# Patient Record
Sex: MALE | Race: WHITE | Employment: OTHER | URBAN - METROPOLITAN AREA
[De-identification: names, ages, dates, MRNs, and addresses within clinical notes are randomized per-mention and may not be internally consistent; named-entity substitution may affect disease eponyms.]

---

## 2024-09-12 ENCOUNTER — HOSPITAL ENCOUNTER (EMERGENCY)
Age: 79
Discharge: HOME | End: 2024-09-12
Attending: EMERGENCY MEDICINE
Payer: MEDICARE

## 2024-09-12 VITALS
HEART RATE: 81 BPM | WEIGHT: 171.96 LBS | BODY MASS INDEX: 26.06 KG/M2 | HEIGHT: 68 IN | OXYGEN SATURATION: 100 % | SYSTOLIC BLOOD PRESSURE: 150 MMHG | TEMPERATURE: 98.1 F | DIASTOLIC BLOOD PRESSURE: 69 MMHG | RESPIRATION RATE: 18 BRPM

## 2024-09-12 DIAGNOSIS — R51.9 TEMPORAL PAIN: Primary | ICD-10-CM

## 2024-09-12 PROCEDURE — 99282 EMERGENCY DEPT VISIT SF MDM: CPT

## 2024-09-12 ASSESSMENT — PAIN DESCRIPTION - ORIENTATION: ORIENTATION: RIGHT

## 2024-09-12 ASSESSMENT — PAIN DESCRIPTION - LOCATION: LOCATION: HEAD

## 2024-09-12 ASSESSMENT — PAIN SCALES - GENERAL: PAINLEVEL_OUTOF10: 5

## 2024-09-12 ASSESSMENT — PAIN - FUNCTIONAL ASSESSMENT: PAIN_FUNCTIONAL_ASSESSMENT: 0-10

## 2024-09-12 ASSESSMENT — PAIN DESCRIPTION - PAIN TYPE: TYPE: ACUTE PAIN

## 2025-01-18 ENCOUNTER — APPOINTMENT (OUTPATIENT)
Dept: CT IMAGING | Age: 80
DRG: 445 | End: 2025-01-18
Payer: MEDICARE

## 2025-01-18 ENCOUNTER — HOSPITAL ENCOUNTER (INPATIENT)
Age: 80
LOS: 4 days | Discharge: HOME | DRG: 445 | End: 2025-01-22
Attending: EMERGENCY MEDICINE | Admitting: STUDENT IN AN ORGANIZED HEALTH CARE EDUCATION/TRAINING PROGRAM
Payer: MEDICARE

## 2025-01-18 ENCOUNTER — APPOINTMENT (OUTPATIENT)
Dept: GENERAL RADIOLOGY | Age: 80
DRG: 445 | End: 2025-01-18
Payer: MEDICARE
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
## 2025-01-18 DIAGNOSIS — R74.8 ELEVATED LIVER ENZYMES: ICD-10-CM

## 2025-01-18 DIAGNOSIS — J90 BILATERAL PLEURAL EFFUSION: ICD-10-CM

## 2025-01-18 DIAGNOSIS — K81.9 CHOLECYSTITIS: Primary | ICD-10-CM

## 2025-01-18 PROBLEM — K80.20 SYMPTOMATIC CHOLELITHIASIS: Status: ACTIVE | Noted: 2025-01-18

## 2025-01-18 LAB
ALBUMIN SERPL-MCNC: 3.8 G/DL (ref 3.5–5.2)
ALBUMIN/GLOB SERPL: 1.2 {RATIO} (ref 1–2.5)
ALP SERPL-CCNC: 248 U/L (ref 40–129)
ALT SERPL-CCNC: 168 U/L (ref 5–41)
AMMONIA PLAS-SCNC: 20 UMOL/L (ref 16–60)
ANION GAP SERPL CALCULATED.3IONS-SCNC: 10 MMOL/L (ref 9–17)
AST SERPL-CCNC: 170 U/L
BASOPHILS # BLD: 0 K/UL (ref 0–0.2)
BASOPHILS NFR BLD: 0 % (ref 0–2)
BILIRUB SERPL-MCNC: 4 MG/DL (ref 0.3–1.2)
BILIRUB UR QL STRIP: NEGATIVE
BNP SERPL-MCNC: 525 PG/ML
BUN SERPL-MCNC: 16 MG/DL (ref 8–23)
CALCIUM SERPL-MCNC: 8.7 MG/DL (ref 8.6–10.4)
CHLORIDE SERPL-SCNC: 102 MMOL/L (ref 98–107)
CLARITY UR: CLEAR
CO2 SERPL-SCNC: 23 MMOL/L (ref 20–31)
COLOR UR: YELLOW
COMMENT: ABNORMAL
CREAT SERPL-MCNC: 0.7 MG/DL (ref 0.7–1.2)
D DIMER PPP FEU-MCNC: 0.97 UG/ML FEU (ref 0–0.59)
EOSINOPHIL # BLD: 0.1 K/UL (ref 0–0.4)
EOSINOPHILS RELATIVE PERCENT: 1 % (ref 1–4)
ERYTHROCYTE [DISTWIDTH] IN BLOOD BY AUTOMATED COUNT: 13.8 % (ref 12.5–15.4)
FLUAV AG SPEC QL: NEGATIVE
FLUBV AG SPEC QL: NEGATIVE
GFR, ESTIMATED: >90 ML/MIN/1.73M2
GLUCOSE SERPL-MCNC: 141 MG/DL (ref 70–99)
GLUCOSE UR STRIP-MCNC: ABNORMAL MG/DL
HCT VFR BLD AUTO: 44.2 % (ref 41–53)
HGB BLD-MCNC: 15.3 G/DL (ref 13.5–17.5)
HGB UR QL STRIP.AUTO: NEGATIVE
KETONES UR STRIP-MCNC: NEGATIVE MG/DL
LACTATE BLDV-SCNC: 1.3 MMOL/L (ref 0.5–2.2)
LEUKOCYTE ESTERASE UR QL STRIP: NEGATIVE
LIPASE SERPL-CCNC: 187 U/L (ref 13–60)
LYMPHOCYTES NFR BLD: 2.3 K/UL (ref 1–4.8)
LYMPHOCYTES RELATIVE PERCENT: 23 % (ref 24–44)
MAGNESIUM SERPL-MCNC: 2.1 MG/DL (ref 1.6–2.6)
MCH RBC QN AUTO: 31.6 PG (ref 26–34)
MCHC RBC AUTO-ENTMCNC: 34.7 G/DL (ref 31–37)
MCV RBC AUTO: 91.2 FL (ref 80–100)
MONOCYTES NFR BLD: 1.1 K/UL (ref 0.1–1.2)
MONOCYTES NFR BLD: 11 % (ref 2–11)
NEUTROPHILS NFR BLD: 65 % (ref 36–66)
NEUTS SEG NFR BLD: 6.6 K/UL (ref 1.8–7.7)
NITRITE UR QL STRIP: NEGATIVE
PH UR STRIP: 5.5 [PH] (ref 5–8)
PLATELET # BLD AUTO: 148 K/UL (ref 140–450)
PMV BLD AUTO: 9.1 FL (ref 6–12)
POTASSIUM SERPL-SCNC: 4 MMOL/L (ref 3.7–5.3)
PROT SERPL-MCNC: 6.9 G/DL (ref 6.4–8.3)
PROT UR STRIP-MCNC: NEGATIVE MG/DL
RBC # BLD AUTO: 4.84 M/UL (ref 4.5–5.9)
SARS-COV-2 RDRP RESP QL NAA+PROBE: NOT DETECTED
SODIUM SERPL-SCNC: 135 MMOL/L (ref 135–144)
SP GR UR STRIP: 1.01 (ref 1–1.03)
SPECIMEN DESCRIPTION: NORMAL
TROPONIN I SERPL HS-MCNC: 10 NG/L (ref 0–22)
TROPONIN I SERPL HS-MCNC: 9 NG/L (ref 0–22)
TSH SERPL DL<=0.05 MIU/L-ACNC: 2.23 UIU/ML (ref 0.3–5)
UROBILINOGEN UR STRIP-ACNC: NORMAL EU/DL (ref 0–1)
WBC OTHER # BLD: 10.2 K/UL (ref 3.5–11)

## 2025-01-18 PROCEDURE — 83690 ASSAY OF LIPASE: CPT

## 2025-01-18 PROCEDURE — 70450 CT HEAD/BRAIN W/O DYE: CPT

## 2025-01-18 PROCEDURE — 2580000003 HC RX 258

## 2025-01-18 PROCEDURE — 6360000002 HC RX W HCPCS: Performed by: PHYSICIAN ASSISTANT

## 2025-01-18 PROCEDURE — 2500000003 HC RX 250 WO HCPCS: Performed by: EMERGENCY MEDICINE

## 2025-01-18 PROCEDURE — 86644 CMV ANTIBODY: CPT

## 2025-01-18 PROCEDURE — 84443 ASSAY THYROID STIM HORMONE: CPT

## 2025-01-18 PROCEDURE — 71260 CT THORAX DX C+: CPT

## 2025-01-18 PROCEDURE — 1200000000 HC SEMI PRIVATE

## 2025-01-18 PROCEDURE — 84484 ASSAY OF TROPONIN QUANT: CPT

## 2025-01-18 PROCEDURE — 86664 EPSTEIN-BARR NUCLEAR ANTIGEN: CPT

## 2025-01-18 PROCEDURE — 86665 EPSTEIN-BARR CAPSID VCA: CPT

## 2025-01-18 PROCEDURE — 83605 ASSAY OF LACTIC ACID: CPT

## 2025-01-18 PROCEDURE — 6360000004 HC RX CONTRAST MEDICATION: Performed by: EMERGENCY MEDICINE

## 2025-01-18 PROCEDURE — 83735 ASSAY OF MAGNESIUM: CPT

## 2025-01-18 PROCEDURE — 82140 ASSAY OF AMMONIA: CPT

## 2025-01-18 PROCEDURE — 85379 FIBRIN DEGRADATION QUANT: CPT

## 2025-01-18 PROCEDURE — 83516 IMMUNOASSAY NONANTIBODY: CPT

## 2025-01-18 PROCEDURE — 80053 COMPREHEN METABOLIC PANEL: CPT

## 2025-01-18 PROCEDURE — 71045 X-RAY EXAM CHEST 1 VIEW: CPT

## 2025-01-18 PROCEDURE — 86663 EPSTEIN-BARR ANTIBODY: CPT

## 2025-01-18 PROCEDURE — 36415 COLL VENOUS BLD VENIPUNCTURE: CPT

## 2025-01-18 PROCEDURE — 86225 DNA ANTIBODY NATIVE: CPT

## 2025-01-18 PROCEDURE — 93005 ELECTROCARDIOGRAM TRACING: CPT

## 2025-01-18 PROCEDURE — 81003 URINALYSIS AUTO W/O SCOPE: CPT

## 2025-01-18 PROCEDURE — 85025 COMPLETE CBC W/AUTO DIFF WBC: CPT

## 2025-01-18 PROCEDURE — 74177 CT ABD & PELVIS W/CONTRAST: CPT

## 2025-01-18 PROCEDURE — 86038 ANTINUCLEAR ANTIBODIES: CPT

## 2025-01-18 PROCEDURE — 87086 URINE CULTURE/COLONY COUNT: CPT

## 2025-01-18 PROCEDURE — 87804 INFLUENZA ASSAY W/OPTIC: CPT

## 2025-01-18 PROCEDURE — 86645 CMV ANTIBODY IGM: CPT

## 2025-01-18 PROCEDURE — 99285 EMERGENCY DEPT VISIT HI MDM: CPT

## 2025-01-18 PROCEDURE — 83880 ASSAY OF NATRIURETIC PEPTIDE: CPT

## 2025-01-18 PROCEDURE — 80074 ACUTE HEPATITIS PANEL: CPT

## 2025-01-18 PROCEDURE — 87635 SARS-COV-2 COVID-19 AMP PRB: CPT

## 2025-01-18 RX ORDER — ACETAMINOPHEN 325 MG/1
650 TABLET ORAL EVERY 6 HOURS PRN
Status: DISCONTINUED | OUTPATIENT
Start: 2025-01-18 | End: 2025-01-22 | Stop reason: HOSPADM

## 2025-01-18 RX ORDER — CIPROFLOXACIN 2 MG/ML
400 INJECTION, SOLUTION INTRAVENOUS ONCE
Status: COMPLETED | OUTPATIENT
Start: 2025-01-18 | End: 2025-01-18

## 2025-01-18 RX ORDER — FAMOTIDINE 40 MG/1
40 TABLET, FILM COATED ORAL 2 TIMES DAILY
COMMUNITY

## 2025-01-18 RX ORDER — BISOPROLOL FUMARATE 5 MG/1
5 TABLET, FILM COATED ORAL DAILY
COMMUNITY

## 2025-01-18 RX ORDER — ACETAMINOPHEN 650 MG/1
650 SUPPOSITORY RECTAL EVERY 6 HOURS PRN
Status: DISCONTINUED | OUTPATIENT
Start: 2025-01-18 | End: 2025-01-22 | Stop reason: HOSPADM

## 2025-01-18 RX ORDER — ALFUZOSIN HYDROCHLORIDE 10 MG/1
10 TABLET, EXTENDED RELEASE ORAL 2 TIMES DAILY
COMMUNITY

## 2025-01-18 RX ORDER — SODIUM CHLORIDE 9 MG/ML
INJECTION, SOLUTION INTRAVENOUS CONTINUOUS
Status: DISCONTINUED | OUTPATIENT
Start: 2025-01-18 | End: 2025-01-19

## 2025-01-18 RX ORDER — LOSARTAN POTASSIUM 50 MG/1
50 TABLET ORAL DAILY
COMMUNITY

## 2025-01-18 RX ORDER — ONDANSETRON 4 MG/1
4 TABLET, ORALLY DISINTEGRATING ORAL EVERY 8 HOURS PRN
Status: DISCONTINUED | OUTPATIENT
Start: 2025-01-18 | End: 2025-01-22 | Stop reason: HOSPADM

## 2025-01-18 RX ORDER — SILODOSIN 8 MG/1
8 CAPSULE ORAL EVERY EVENING
COMMUNITY

## 2025-01-18 RX ORDER — ASPIRIN 81 MG/1
81 TABLET, CHEWABLE ORAL DAILY
COMMUNITY

## 2025-01-18 RX ORDER — SODIUM CHLORIDE 0.9 % (FLUSH) 0.9 %
5-40 SYRINGE (ML) INJECTION EVERY 12 HOURS SCHEDULED
Status: DISCONTINUED | OUTPATIENT
Start: 2025-01-18 | End: 2025-01-22 | Stop reason: HOSPADM

## 2025-01-18 RX ORDER — SODIUM CHLORIDE 9 MG/ML
INJECTION, SOLUTION INTRAVENOUS PRN
Status: DISCONTINUED | OUTPATIENT
Start: 2025-01-18 | End: 2025-01-22 | Stop reason: HOSPADM

## 2025-01-18 RX ORDER — SODIUM CHLORIDE 0.9 % (FLUSH) 0.9 %
10 SYRINGE (ML) INJECTION PRN
Status: DISCONTINUED | OUTPATIENT
Start: 2025-01-18 | End: 2025-01-22 | Stop reason: HOSPADM

## 2025-01-18 RX ORDER — ONDANSETRON 2 MG/ML
4 INJECTION INTRAMUSCULAR; INTRAVENOUS EVERY 6 HOURS PRN
Status: DISCONTINUED | OUTPATIENT
Start: 2025-01-18 | End: 2025-01-22 | Stop reason: HOSPADM

## 2025-01-18 RX ORDER — POLYETHYLENE GLYCOL 3350 17 G/17G
17 POWDER, FOR SOLUTION ORAL DAILY PRN
Status: DISCONTINUED | OUTPATIENT
Start: 2025-01-18 | End: 2025-01-22 | Stop reason: HOSPADM

## 2025-01-18 RX ORDER — INSULIN GLARGINE 100 [IU]/ML
INJECTION, SOLUTION SUBCUTANEOUS
Status: ON HOLD | COMMUNITY
End: 2025-01-22 | Stop reason: HOSPADM

## 2025-01-18 RX ORDER — DUTASTERIDE 0.5 MG/1
0.5 CAPSULE, LIQUID FILLED ORAL DAILY
COMMUNITY

## 2025-01-18 RX ORDER — ENOXAPARIN SODIUM 100 MG/ML
40 INJECTION SUBCUTANEOUS DAILY
Status: DISCONTINUED | OUTPATIENT
Start: 2025-01-19 | End: 2025-01-22 | Stop reason: HOSPADM

## 2025-01-18 RX ORDER — 0.9 % SODIUM CHLORIDE 0.9 %
80 INTRAVENOUS SOLUTION INTRAVENOUS ONCE
Status: DISCONTINUED | OUTPATIENT
Start: 2025-01-18 | End: 2025-01-22 | Stop reason: HOSPADM

## 2025-01-18 RX ORDER — METRONIDAZOLE 500 MG/100ML
500 INJECTION, SOLUTION INTRAVENOUS EVERY 8 HOURS
Status: DISCONTINUED | OUTPATIENT
Start: 2025-01-19 | End: 2025-01-20

## 2025-01-18 RX ORDER — MORPHINE SULFATE 2 MG/ML
1 INJECTION, SOLUTION INTRAMUSCULAR; INTRAVENOUS EVERY 4 HOURS PRN
Status: DISCONTINUED | OUTPATIENT
Start: 2025-01-18 | End: 2025-01-22 | Stop reason: HOSPADM

## 2025-01-18 RX ORDER — POTASSIUM CHLORIDE 7.45 MG/ML
10 INJECTION INTRAVENOUS PRN
Status: DISCONTINUED | OUTPATIENT
Start: 2025-01-18 | End: 2025-01-22 | Stop reason: HOSPADM

## 2025-01-18 RX ORDER — METRONIDAZOLE 500 MG/100ML
500 INJECTION, SOLUTION INTRAVENOUS ONCE
Status: COMPLETED | OUTPATIENT
Start: 2025-01-18 | End: 2025-01-18

## 2025-01-18 RX ORDER — POTASSIUM CHLORIDE 1500 MG/1
40 TABLET, EXTENDED RELEASE ORAL PRN
Status: DISCONTINUED | OUTPATIENT
Start: 2025-01-18 | End: 2025-01-22 | Stop reason: HOSPADM

## 2025-01-18 RX ORDER — SITAGLIPTIN AND METFORMIN HYDROCHLORIDE 500; 50 MG/1; MG/1
1 TABLET, FILM COATED ORAL 2 TIMES DAILY WITH MEALS
COMMUNITY

## 2025-01-18 RX ORDER — SERTRALINE HYDROCHLORIDE 25 MG/1
25 TABLET, FILM COATED ORAL DAILY
COMMUNITY

## 2025-01-18 RX ORDER — IOPAMIDOL 755 MG/ML
75 INJECTION, SOLUTION INTRAVASCULAR
Status: COMPLETED | OUTPATIENT
Start: 2025-01-18 | End: 2025-01-18

## 2025-01-18 RX ORDER — MAGNESIUM SULFATE IN WATER 40 MG/ML
2000 INJECTION, SOLUTION INTRAVENOUS PRN
Status: DISCONTINUED | OUTPATIENT
Start: 2025-01-18 | End: 2025-01-22 | Stop reason: HOSPADM

## 2025-01-18 RX ORDER — MEMANTINE HYDROCHLORIDE 5 MG/1
5 TABLET ORAL DAILY
COMMUNITY

## 2025-01-18 RX ADMIN — SODIUM CHLORIDE: 9 INJECTION, SOLUTION INTRAVENOUS at 22:54

## 2025-01-18 RX ADMIN — IOPAMIDOL 75 ML: 755 INJECTION, SOLUTION INTRAVENOUS at 17:00

## 2025-01-18 RX ADMIN — CIPROFLOXACIN 400 MG: 2 INJECTION, SOLUTION INTRAVENOUS at 19:46

## 2025-01-18 RX ADMIN — Medication 80 ML: at 17:00

## 2025-01-18 RX ADMIN — METRONIDAZOLE 500 MG: 500 INJECTION, SOLUTION INTRAVENOUS at 21:07

## 2025-01-18 RX ADMIN — SODIUM CHLORIDE, PRESERVATIVE FREE 10 ML: 5 INJECTION INTRAVENOUS at 17:00

## 2025-01-18 ASSESSMENT — PAIN - FUNCTIONAL ASSESSMENT: PAIN_FUNCTIONAL_ASSESSMENT: NONE - DENIES PAIN

## 2025-01-18 NOTE — ED PROVIDER NOTES
Emergency Department     Faculty Attestation    I performed a history and physical examination of the patient and discussed management with the mid level provider. I reviewed the mid level provider's note and agree with the documented findings and plan of care. Any areas of disagreement are noted on the chart. I was personally present for the key portions of any procedures. I have documented in the chart those procedures where I was not present during the key portions. I have reviewed the emergency nurses triage note. I agree with the chief complaint, past medical history, past surgical history, allergies, medications, social and family history as documented unless otherwise noted below. Documentation of the HPI, Physical Exam and Medical Decision Making performed by medical students or scribes is based on my personal performance of the HPI, PE and MDM. For Physician Assistant/ Nurse Practitioner cases/documentation I have personally evaluated this patient and have completed at least one if not all key elements of the E/M (history, physical exam, and MDM). Additional findings are as noted.      Primary Care Physician:  No primary care provider on file.    CHIEF COMPLAINT       Chief Complaint   Patient presents with    Fatigue    Fall     Pt arrives with co falling and leg weakness.Pt states he has been here for approx 5 mths from [place] and is due to go back on the 1/29/25. Per pt his family is concerned with his falling and would like to make sure he doesn't have clots in his legs before traveling.        RECENT VITALS:   Temp: 97.9 °F (36.6 °C),  Pulse: 76, Respirations: 16, BP: 128/68    LABS:  Labs Reviewed   CBC WITH AUTO DIFFERENTIAL - Abnormal; Notable for the following components:       Result Value    Lymphocytes % 23 (*)     All other components within normal limits   COMPREHENSIVE METABOLIC PANEL - Abnormal; Notable for the following components:    Glucose 141 (*)     Total Bilirubin 4.0 (*)

## 2025-01-18 NOTE — ED PROVIDER NOTES
EMERGENCY DEPARTMENT ENCOUNTER    Pt Name: Robert Connolly  MRN: [id number]  Birthdate 1945  Date of evaluation: 1/18/25  CHIEF COMPLAINT       Chief Complaint   Patient presents with    Fatigue    Fall     Pt arrives with co falling and leg weakness.Pt states he has been here for approx 5 mths from [place] and is due to go back on the 1/29/25. Per pt his family is concerned with his falling and would like to make sure he doesn't have clots in his legs before traveling.      HISTORY OF PRESENT ILLNESS   Patient is a 79-year-old male who presents with his son-in-law for evaluation of falls.  The patient states that these falls originally started back in August to 2024.  He usually lives in [place] but in August had started making a trip to see his brothers and sisters and his children along the way.  At that time he was in [place] visiting his brother who happens to be a heart surgeon.  He had a syncopal episode while he was standing up urinating.  There was a workup obtained and he was told that his syncope was secondary to multiple concussions that he had when he was younger.  The patient states that since then especially over the past week he has had multiple falls where he trips and falls, does not feel like he is passing out.  3 days ago he started to have right sided chest pressure which she has been treating at home with Tylenol.  He does have difficulty getting up off of the commode.  He has not had a cough fever or chills.  He             REVIEW OF SYSTEMS     Review of Systems   Constitutional:  Negative for chills and fever.   HENT:  Negative for ear pain, rhinorrhea and sore throat.    Eyes:  Negative for pain, discharge and itching.   Respiratory:  Negative for cough, shortness of breath and wheezing.    Cardiovascular:  Positive for chest pain and palpitations.   Gastrointestinal:  Positive for nausea. Negative for vomiting.   Genitourinary:  Negative for difficulty urinating, dysuria and

## 2025-01-19 PROBLEM — K81.9 CHOLECYSTITIS: Status: ACTIVE | Noted: 2025-01-19

## 2025-01-19 PROBLEM — R79.89 ELEVATED LFTS: Status: ACTIVE | Noted: 2025-01-19

## 2025-01-19 LAB
ALBUMIN SERPL-MCNC: 3.5 G/DL (ref 3.5–5.2)
ALBUMIN/GLOB SERPL: 1.3 {RATIO} (ref 1–2.5)
ALP SERPL-CCNC: 220 U/L (ref 40–129)
ALT SERPL-CCNC: 111 U/L (ref 5–41)
ANION GAP SERPL CALCULATED.3IONS-SCNC: 9 MMOL/L (ref 9–17)
AST SERPL-CCNC: 77 U/L
BASOPHILS # BLD: 0.02 K/UL (ref 0–0.2)
BASOPHILS NFR BLD: 0 % (ref 0–2)
BILIRUB SERPL-MCNC: 3.3 MG/DL (ref 0.3–1.2)
BUN SERPL-MCNC: 12 MG/DL (ref 8–23)
CALCIUM SERPL-MCNC: 8.3 MG/DL (ref 8.6–10.4)
CHLORIDE SERPL-SCNC: 107 MMOL/L (ref 98–107)
CHOLEST SERPL-MCNC: 79 MG/DL (ref 0–199)
CHOLESTEROL/HDL RATIO: 2.3
CO2 SERPL-SCNC: 24 MMOL/L (ref 20–31)
CREAT SERPL-MCNC: 0.7 MG/DL (ref 0.7–1.2)
EOSINOPHIL # BLD: 0.17 K/UL (ref 0–0.4)
EOSINOPHILS RELATIVE PERCENT: 2 % (ref 1–4)
ERYTHROCYTE [DISTWIDTH] IN BLOOD BY AUTOMATED COUNT: 13.3 % (ref 12.5–15.4)
GFR, ESTIMATED: >90 ML/MIN/1.73M2
GLUCOSE BLD-MCNC: 106 MG/DL (ref 75–110)
GLUCOSE BLD-MCNC: 143 MG/DL (ref 75–110)
GLUCOSE BLD-MCNC: 69 MG/DL (ref 75–110)
GLUCOSE BLD-MCNC: 69 MG/DL (ref 75–110)
GLUCOSE BLD-MCNC: 87 MG/DL (ref 75–110)
GLUCOSE BLD-MCNC: 88 MG/DL (ref 75–110)
GLUCOSE SERPL-MCNC: 87 MG/DL (ref 70–99)
HAV IGM SERPL QL IA: NONREACTIVE
HBV CORE IGM SERPL QL IA: NONREACTIVE
HBV SURFACE AG SERPL QL IA: NONREACTIVE
HCT VFR BLD AUTO: 41 % (ref 41–53)
HCV AB SERPL QL IA: NONREACTIVE
HDLC SERPL-MCNC: 35 MG/DL
HGB BLD-MCNC: 14.3 G/DL (ref 13.5–17.5)
LDLC SERPL CALC-MCNC: 28 MG/DL (ref 0–100)
LIPASE SERPL-CCNC: 196 U/L (ref 13–60)
LYMPHOCYTES NFR BLD: 2.17 K/UL (ref 1–4.8)
LYMPHOCYTES RELATIVE PERCENT: 28 % (ref 24–44)
MAGNESIUM SERPL-MCNC: 2.2 MG/DL (ref 1.6–2.6)
MCH RBC QN AUTO: 31 PG (ref 26–34)
MCHC RBC AUTO-ENTMCNC: 34.9 G/DL (ref 31–37)
MCV RBC AUTO: 88.7 FL (ref 80–100)
MONOCYTES NFR BLD: 0.87 K/UL (ref 0.1–1.2)
MONOCYTES NFR BLD: 11 % (ref 2–11)
NEUTROPHILS NFR BLD: 59 % (ref 36–66)
NEUTS SEG NFR BLD: 4.64 K/UL (ref 1.8–7.7)
PHOSPHATE SERPL-MCNC: 2.8 MG/DL (ref 2.5–4.5)
PLATELET # BLD AUTO: 136 K/UL (ref 140–450)
PMV BLD AUTO: 10.8 FL (ref 8–14)
POTASSIUM SERPL-SCNC: 3.5 MMOL/L (ref 3.7–5.3)
PROT SERPL-MCNC: 6.2 G/DL (ref 6.4–8.3)
RBC # BLD AUTO: 4.62 M/UL (ref 4.5–5.9)
SODIUM SERPL-SCNC: 140 MMOL/L (ref 135–144)
TRIGL SERPL-MCNC: 81 MG/DL
VLDLC SERPL CALC-MCNC: 16 MG/DL (ref 1–30)
WBC OTHER # BLD: 7.9 K/UL (ref 3.5–11)

## 2025-01-19 PROCEDURE — APPNB60 APP NON BILLABLE TIME 46-60 MINS: Performed by: NURSE PRACTITIONER

## 2025-01-19 PROCEDURE — 83516 IMMUNOASSAY NONANTIBODY: CPT

## 2025-01-19 PROCEDURE — 86376 MICROSOMAL ANTIBODY EACH: CPT

## 2025-01-19 PROCEDURE — 6370000000 HC RX 637 (ALT 250 FOR IP): Performed by: INTERNAL MEDICINE

## 2025-01-19 PROCEDURE — 2580000003 HC RX 258: Performed by: NURSE PRACTITIONER

## 2025-01-19 PROCEDURE — 82390 ASSAY OF CERULOPLASMIN: CPT

## 2025-01-19 PROCEDURE — 36415 COLL VENOUS BLD VENIPUNCTURE: CPT

## 2025-01-19 PROCEDURE — 6370000000 HC RX 637 (ALT 250 FOR IP)

## 2025-01-19 PROCEDURE — 1200000000 HC SEMI PRIVATE

## 2025-01-19 PROCEDURE — 6360000002 HC RX W HCPCS

## 2025-01-19 PROCEDURE — 99223 1ST HOSP IP/OBS HIGH 75: CPT | Performed by: INTERNAL MEDICINE

## 2025-01-19 PROCEDURE — 84100 ASSAY OF PHOSPHORUS: CPT

## 2025-01-19 PROCEDURE — 83690 ASSAY OF LIPASE: CPT

## 2025-01-19 PROCEDURE — 80053 COMPREHEN METABOLIC PANEL: CPT

## 2025-01-19 PROCEDURE — 82103 ALPHA-1-ANTITRYPSIN TOTAL: CPT

## 2025-01-19 PROCEDURE — 85025 COMPLETE CBC W/AUTO DIFF WBC: CPT

## 2025-01-19 PROCEDURE — 80061 LIPID PANEL: CPT

## 2025-01-19 PROCEDURE — 82947 ASSAY GLUCOSE BLOOD QUANT: CPT

## 2025-01-19 PROCEDURE — 83735 ASSAY OF MAGNESIUM: CPT

## 2025-01-19 PROCEDURE — 2580000003 HC RX 258

## 2025-01-19 PROCEDURE — 99222 1ST HOSP IP/OBS MODERATE 55: CPT | Performed by: INTERNAL MEDICINE

## 2025-01-19 RX ORDER — SERTRALINE HYDROCHLORIDE 25 MG/1
25 TABLET, FILM COATED ORAL DAILY
Status: DISCONTINUED | OUTPATIENT
Start: 2025-01-19 | End: 2025-01-22 | Stop reason: HOSPADM

## 2025-01-19 RX ORDER — FINASTERIDE 5 MG/1
5 TABLET, FILM COATED ORAL DAILY
Status: DISCONTINUED | OUTPATIENT
Start: 2025-01-19 | End: 2025-01-22 | Stop reason: HOSPADM

## 2025-01-19 RX ORDER — DEXTROSE MONOHYDRATE 100 MG/ML
INJECTION, SOLUTION INTRAVENOUS CONTINUOUS PRN
Status: DISCONTINUED | OUTPATIENT
Start: 2025-01-19 | End: 2025-01-22 | Stop reason: HOSPADM

## 2025-01-19 RX ORDER — GLUCAGON 1 MG/ML
1 KIT INJECTION PRN
Status: DISCONTINUED | OUTPATIENT
Start: 2025-01-19 | End: 2025-01-22 | Stop reason: HOSPADM

## 2025-01-19 RX ORDER — DEXTROSE MONOHYDRATE AND SODIUM CHLORIDE 5; .45 G/100ML; G/100ML
INJECTION, SOLUTION INTRAVENOUS CONTINUOUS
Status: DISCONTINUED | OUTPATIENT
Start: 2025-01-19 | End: 2025-01-20

## 2025-01-19 RX ORDER — ALOGLIPTIN 12.5 MG/1
12.5 TABLET, FILM COATED ORAL DAILY
Status: DISCONTINUED | OUTPATIENT
Start: 2025-01-19 | End: 2025-01-22 | Stop reason: HOSPADM

## 2025-01-19 RX ORDER — METOPROLOL SUCCINATE 50 MG/1
50 TABLET, EXTENDED RELEASE ORAL DAILY
Status: DISCONTINUED | OUTPATIENT
Start: 2025-01-19 | End: 2025-01-22 | Stop reason: HOSPADM

## 2025-01-19 RX ORDER — TAMSULOSIN HYDROCHLORIDE 0.4 MG/1
0.4 CAPSULE ORAL DAILY
Status: DISCONTINUED | OUTPATIENT
Start: 2025-01-19 | End: 2025-01-22 | Stop reason: HOSPADM

## 2025-01-19 RX ORDER — LOSARTAN POTASSIUM 50 MG/1
50 TABLET ORAL DAILY
Status: DISCONTINUED | OUTPATIENT
Start: 2025-01-19 | End: 2025-01-22 | Stop reason: HOSPADM

## 2025-01-19 RX ORDER — VITAMIN B COMPLEX
1000 TABLET ORAL DAILY
Status: DISCONTINUED | OUTPATIENT
Start: 2025-01-19 | End: 2025-01-22 | Stop reason: HOSPADM

## 2025-01-19 RX ORDER — MEMANTINE HYDROCHLORIDE 5 MG/1
5 TABLET ORAL 2 TIMES DAILY
Status: DISCONTINUED | OUTPATIENT
Start: 2025-01-19 | End: 2025-01-22 | Stop reason: HOSPADM

## 2025-01-19 RX ORDER — INSULIN LISPRO 100 [IU]/ML
0-4 INJECTION, SOLUTION INTRAVENOUS; SUBCUTANEOUS
Status: DISCONTINUED | OUTPATIENT
Start: 2025-01-19 | End: 2025-01-22 | Stop reason: HOSPADM

## 2025-01-19 RX ADMIN — ALOGLIPTIN 12.5 MG: 12.5 TABLET, FILM COATED ORAL at 09:02

## 2025-01-19 RX ADMIN — METRONIDAZOLE 500 MG: 500 INJECTION, SOLUTION INTRAVENOUS at 20:22

## 2025-01-19 RX ADMIN — Medication 16 G: at 11:54

## 2025-01-19 RX ADMIN — LOSARTAN POTASSIUM 50 MG: 50 TABLET, FILM COATED ORAL at 09:02

## 2025-01-19 RX ADMIN — METOPROLOL SUCCINATE 50 MG: 50 TABLET, EXTENDED RELEASE ORAL at 09:01

## 2025-01-19 RX ADMIN — DEXTROSE AND SODIUM CHLORIDE: 5; 450 INJECTION, SOLUTION INTRAVENOUS at 21:36

## 2025-01-19 RX ADMIN — EMPAGLIFLOZIN 10 MG: 10 TABLET, FILM COATED ORAL at 09:02

## 2025-01-19 RX ADMIN — METRONIDAZOLE 500 MG: 500 INJECTION, SOLUTION INTRAVENOUS at 05:21

## 2025-01-19 RX ADMIN — FINASTERIDE 5 MG: 5 TABLET, FILM COATED ORAL at 09:02

## 2025-01-19 RX ADMIN — POTASSIUM CHLORIDE 40 MEQ: 1500 TABLET, EXTENDED RELEASE ORAL at 13:05

## 2025-01-19 RX ADMIN — SERTRALINE HYDROCHLORIDE 25 MG: 25 TABLET ORAL at 09:02

## 2025-01-19 RX ADMIN — Medication 1000 UNITS: at 09:02

## 2025-01-19 RX ADMIN — PANTOPRAZOLE SODIUM 40 MG: 40 INJECTION, POWDER, FOR SOLUTION INTRAVENOUS at 09:01

## 2025-01-19 RX ADMIN — MEMANTINE 5 MG: 5 TABLET ORAL at 20:25

## 2025-01-19 RX ADMIN — Medication 16 G: at 17:08

## 2025-01-19 RX ADMIN — TAMSULOSIN HYDROCHLORIDE 0.4 MG: 0.4 CAPSULE ORAL at 09:02

## 2025-01-19 RX ADMIN — ENOXAPARIN SODIUM 40 MG: 100 INJECTION SUBCUTANEOUS at 09:01

## 2025-01-19 RX ADMIN — MEMANTINE 5 MG: 5 TABLET ORAL at 09:02

## 2025-01-19 RX ADMIN — METRONIDAZOLE 500 MG: 500 INJECTION, SOLUTION INTRAVENOUS at 13:05

## 2025-01-19 NOTE — CONSULTS
General Surgery:  Consult Note        PATIENT NAME: Robert Connolly   YOB: 1945    ADMISSION DATE: 1/18/2025  3:09 PM      TODAY'S DATE: 1/19/2025    Chief Complaint: Falls, fatigue  Consult Regarding: Mild gallbladder inflammation, elevated hepatic function panel    HISTORY OF PRESENT ILLNESS:  The patient is a 79 y.o. male  who presented with frequent falls, fatigue, shortness of breath.  Patient underwent lab work and CT scans.  Patient was found to have elevated hepatic function panel, CT scan showed some mild inflammation around the gallbladder as well as cholelithiasis.  Patient was admitted to the hospital for further workup.  General surgery was consulted.  Patient states that he has had some intolerance to greasy and fatty foods in the past and this presents as epigastric abdominal pain.  Patient denies current abdominal pain, nausea, or emesis.  Patient denies any past abdominal surgeries.      Past Medical History:        Diagnosis Date    BPH (benign prostatic hyperplasia)     Diabetes mellitus type 2, controlled (HCC)     Dyslipidemia     Hypertension        Past Surgical History:        Procedure Laterality Date    CATARACT EXTRACTION         Medications Prior to Admission:   Medications Prior to Admission: insulin glargine (LANTUS SOLOSTAR) 100 UNIT/ML injection pen, Inject into the skin  VITAMIN D PO, Take by mouth  sitaGLIPtan-metFORMIN (JANUMET)  MG per tablet, Take 1 tablet by mouth 2 times daily (with meals)  famotidine (PEPCID) 40 MG tablet, Take 1 tablet by mouth daily  dutasteride (AVODART) 0.5 MG capsule, Take 1 capsule by mouth daily  empagliflozin (JARDIANCE) 10 MG tablet, Take 1 tablet by mouth daily  aspirin 81 MG chewable tablet, Take 1 tablet by mouth daily  alfuzosin (UROXATRAL) 10 MG extended release tablet, Take 1 tablet by mouth daily  memantine (NAMENDA) 5 MG tablet, Take 1 tablet by mouth 2 times daily  silodosin (RAPAFLO) 8 MG CAPS, Take 1 capsule by mouth  process      Electronically signed by Abdirashid Cornejo DO  on 1/19/2025 at 3:15 PM

## 2025-01-19 NOTE — CONSULTS
Gastroenterology Consult Note    Patient:   Robert Connolly   Admit date:  1/18/2025  Facility:   [facility]  Referring/PCP: No primary care provider on file.  Date:     1/19/2025  Consultant:   CHARISSE Yadav NP, Yonas Montero MD    Subjective:     This 79 y.o. male was admitted 1/18/2025 with a diagnosis of \"Cholecystitis [K81.9]  Bilateral pleural effusion [J90]  Elevated liver enzymes [R74.8]  Symptomatic cholelithiasis [K80.20]  Fall from bed, initial encounter [W06.XXXA]\" and is seen in consultation regarding   Chief Complaint   Patient presents with    Fatigue    Fall     Pt arrives with co falling and leg weakness.Pt states he has been here for approx 5 mths from [place] and is due to go back on the 1/29/25. Per pt his family is concerned with his falling and would like to make sure he doesn't have clots in his legs before traveling.      79/M w/ pmhx of T2DM, HLD, HTN presents to ED with fatigue, fall, BLE weakness.  Incidentally patient was found to have elevated LFTs.  , 168, , Bilirubin 4.0. Also noted lipase 196.  Patient reports he had some mild nausea 3 days ago.    CT abd/pelvis:  1. No pulmonary embolism.  2. Minimal pleural effusions with adjacent atelectasis.  3. The gallbladder appears mildly inflamed.  Suggest ultrasound for further  evaluation.  4. Nonobstructive right nephrolithiasis.  5. Severe atherosclerotic disease.    Denies any abdominal pain, vomiting, fevers, chills, chest pain, shortness of breath, change in bowel habits.      Denies alcohol  No hx of liver disease.    Past Medical History:  Past Medical History:   Diagnosis Date    BPH (benign prostatic hyperplasia)     Diabetes mellitus type 2, controlled (HCC)     Dyslipidemia     Hypertension        Past Surgical History:  Past Surgical History:   Procedure Laterality Date    CATARACT EXTRACTION         Social History:  Social History     Tobacco Use    Smoking status: Never    Smokeless

## 2025-01-19 NOTE — ED NOTES
ED to inpatient nurses report      Chief Complaint:  Chief Complaint   Patient presents with    Fatigue    Fall     Pt arrives with co falling and leg weakness.Pt states he has been here for approx 5 mths from [place] and is due to go back on the 1/29/25. Per pt his family is concerned with his falling and would like to make sure he doesn't have clots in his legs before traveling.      Present to ED from: home    MOA:     LOC: alert and orientated to name, place, date  Mobility: Independent  Oxygen Baseline: room air    Current needs required: none   Pending ED orders: flagyl  Present condition: stable    Why did the patient come to the ED? Frequent fall. Chest pain for past 3 days (none now)  What is the plan? Treat for cholecystitis with antibiotics   Any procedures or intervention occur? Ct abd  Any safety concerns??fall risk  CODE STATUS No Order  Diet No diet orders on file    Mental Status:       Psych Assessment:   Psychosocial  Psychosocial (WDL): Within Defined Limits  Vital signs   Vitals:    01/18/25 1514 01/18/25 1901 01/18/25 1906   BP: 128/68  126/66   Pulse: 76 72 72   Resp: 16 18 18   Temp: 97.9 °F (36.6 °C)     SpO2: 98% 98% 98%   Weight: 72.6 kg (160 lb)     Height: 1.727 m (5' 8\")          Vitals:  Patient Vitals for the past 24 hrs:   BP Temp Pulse Resp SpO2 Height Weight   01/18/25 1906 126/66 -- 72 18 98 % -- --   01/18/25 1901 -- -- 72 18 98 % -- --   01/18/25 1514 128/68 97.9 °F (36.6 °C) 76 16 98 % 1.727 m (5' 8\") 72.6 kg (160 lb)      Visit Vitals  /66   Pulse 72   Temp 97.9 °F (36.6 °C)   Resp 18   Ht 1.727 m (5' 8\")   Wt 72.6 kg (160 lb)   SpO2 98%   BMI 24.33 kg/m²        LDAs:   Peripheral IV 01/18/25 Left Forearm (Active)   Site Assessment Clean, dry & intact 01/18/25 1524   Line Status Blood return noted 01/18/25 1524   Phlebitis Assessment No symptoms 01/18/25 1524   Infiltration Assessment 0 01/18/25 1524   Dressing Status Clean, dry & intact 01/18/25 1524   Dressing Type  (*)      (*)     All other components within normal limits   D-DIMER, QUANTITATIVE - Abnormal; Notable for the following components:    D-Dimer, Quant 0.97 (*)     All other components within normal limits   URINALYSIS - Abnormal; Notable for the following components:    Glucose, Ur 3+ (*)     All other components within normal limits   BRAIN NATRIURETIC PEPTIDE - Abnormal; Notable for the following components:    NT Pro- (*)     All other components within normal limits   COVID-19, RAPID   RAPID INFLUENZA A/B ANTIGENS   CULTURE, URINE   LACTIC ACID   MAGNESIUM   TROPONIN   TSH   TROPONIN   AMMONIA             ALLERGIES     Cyclobenzaprine and Penicillins    CURRENT MEDICATIONS       Previous Medications    ALFUZOSIN (UROXATRAL) 10 MG EXTENDED RELEASE TABLET    Take 1 tablet by mouth daily    ASPIRIN 81 MG CHEWABLE TABLET    Take 1 tablet by mouth daily    BISOPROLOL (ZEBETA) 5 MG TABLET    Take 1 tablet by mouth daily    DUTASTERIDE (AVODART) 0.5 MG CAPSULE    Take 1 capsule by mouth daily    EMPAGLIFLOZIN (JARDIANCE) 10 MG TABLET    Take 1 tablet by mouth daily    FAMOTIDINE (PEPCID) 40 MG TABLET    Take 1 tablet by mouth daily    INSULIN GLARGINE (LANTUS SOLOSTAR) 100 UNIT/ML INJECTION PEN    Inject into the skin    LOSARTAN (COZAAR) 50 MG TABLET    Take 1 tablet by mouth daily    MEMANTINE (NAMENDA) 5 MG TABLET    Take 1 tablet by mouth 2 times daily    SERTRALINE (ZOLOFT) 25 MG TABLET    Take 1 tablet by mouth daily    SILODOSIN (RAPAFLO) 8 MG CAPS    Take 1 capsule by mouth every evening    SITAGLIPTAN-METFORMIN (JANUMET)  MG PER TABLET    Take 1 tablet by mouth 2 times daily (with meals)    VITAMIN D PO    Take by mouth     Orders Placed This Encounter   Medications    sodium chloride flush 0.9 % injection 10 mL    sodium chloride 0.9 % bolus 80 mL    iopamidol (ISOVUE-370) 76 % injection 75 mL    ciprofloxacin (CIPRO) IVPB 400 mg     Order Specific Question:   Antimicrobial Indications

## 2025-01-19 NOTE — PLAN OF CARE
Problem: Safety - Adult  Goal: Free from fall injury  1/19/2025 1204 by Erika Perez RN  Outcome: Progressing  1/19/2025 0116 by Tuan Wylie RN  Outcome: Progressing     Problem: Discharge Planning  Goal: Discharge to home or other facility with appropriate resources  1/19/2025 1204 by Erika Perez RN  Outcome: Progressing  1/19/2025 0116 by Tuan Wylie RN  Outcome: Progressing     Problem: Musculoskeletal - Adult  Goal: Return mobility to safest level of function  1/19/2025 1204 by Erika Perez RN  Outcome: Progressing  Flowsheets (Taken 1/19/2025 0855)  Return Mobility to Safest Level of Function: Assess patient stability and activity tolerance for standing, transferring and ambulating with or without assistive devices  1/19/2025 0116 by Tuan Wylie RN  Outcome: Progressing     Problem: Infection - Adult  Goal: Absence of infection at discharge  1/19/2025 1204 by Erika Perez RN  Outcome: Progressing  Flowsheets (Taken 1/19/2025 0855)  Absence of infection at discharge: Assess and monitor for signs and symptoms of infection  1/19/2025 0116 by Tuan Wylie RN  Outcome: Progressing     Problem: Hematologic - Adult  Goal: Maintains hematologic stability  1/19/2025 1204 by Erika Perez RN  Outcome: Progressing  Flowsheets (Taken 1/19/2025 0855)  Maintains hematologic stability: Assess for signs and symptoms of bleeding or hemorrhage  1/19/2025 0116 by Tuan Wylie RN  Outcome: Progressing     Problem: Chronic Conditions and Co-morbidities  Goal: Patient's chronic conditions and co-morbidity symptoms are monitored and maintained or improved  Outcome: Progressing

## 2025-01-19 NOTE — H&P
[facility]  Office: [phone]  Manuel Juarez DO, Paul Sharp DO, Joni Joseph DO, Stephen Romero DO, Aleksandr Barnes MD, Trinh Landry MD, Soren Meeks MD, Rebecca De Luna MD,  Hipolito Cantu MD, Do Boyd MD, Jn Amaya MD,  Nicole Pandey DO, Maranda Boswell MD, Young Srivastava MD, Zbigniew Juarez DO, Estephanie Milton MD,  Riley Barnes DO, Kiersten Vickers MD, Noris Couch MD, Rebecca Rebolledo MD, Ady Farfan MD,  Christophe Goodson MD, Randy Tariq MD, Paz Coon MD, Aye Jacobson MD, Nikolas Juarez MD, Caitlin Helms MD, Marin Butcher DO, Arnie Young MD, Nicole Bai MD, Mohsin Reza, MD, Shirley Waterhouse, CNP,  Linh Singh CNP, Marin Bryan, CNP,  Marcelina Zhang, DNP, Renu Romreo, CNP, Marcella Jean, CNP, Lynnette Cameron, CNP, Sara Downs, CNP, Mellisa Izaguirre, PA-C, Kerri Marques PA-C, Araceli Block, CNP, Jose Jacobs, CNP,  Apoorva Aden, CNP, Andreea Nunez, CNP, Aleyda Huffman, CNP,  Misa Yung, CNS, Ana Silva, CNP, Petra Henry, CNP,   Sanjuana Galicia, CNP         [facility]   IN-PATIENT SERVICE   [facility]    HISTORY AND PHYSICAL EXAMINATION            Date:   1/19/2025  Patient name:  Robert Connolly  Date of admission:  1/18/2025  3:09 PM  MRN:   [id number]  Account:  [id number]  YOB: 1945  PCP:    No primary care provider on file.  Room:   [address] [place] [address]  Code Status:    Full Code    Chief Complaint:     Chief Complaint   Patient presents with    Fatigue    Fall     Pt arrives with co falling and leg weakness.Pt states he has been here for approx 5 mths from [place] and is due to go back on the 1/29/25. Per pt his family is concerned with his falling and would like to make sure he doesn't have clots in his legs before traveling.        History Obtained From:     patient    History of Present Illness:     Robert Connolly is a 79 y.o. Non- / non  male who presents with Fatigue and

## 2025-01-19 NOTE — PLAN OF CARE
Problem: Safety - Adult  Goal: Free from fall injury  Outcome: Progressing     Problem: Discharge Planning  Goal: Discharge to home or other facility with appropriate resources  Outcome: Progressing     Problem: Musculoskeletal - Adult  Goal: Return mobility to safest level of function  Outcome: Progressing     Problem: Infection - Adult  Goal: Absence of infection at discharge  Outcome: Progressing     Problem: Hematologic - Adult  Goal: Maintains hematologic stability  Outcome: Progressing

## 2025-01-19 NOTE — PROGRESS NOTES
Spiritual Health History and Assessment/Progress Note  [facility]    (P) Spiritual/Emotional Needs, Crisis, Initial Encounter, (P) Emotional distress, (P) Life Adjustments, Adjustment to illness, Anticipatory Grief,      Name: Robert Connolly MRN: [id number]    Age: 79 y.o.     Sex: male   Language: English   Quaker: None   Symptomatic cholelithiasis     Date: 1/19/2025            Total Time Calculated: (P) 15 min              Spiritual Assessment began in [address]        Referral/Consult From: (P) Rounding   Encounter Overview/Reason: (P) Spiritual/Emotional Needs, Crisis, Initial Encounter  Service Provided For: (P) Patient, Family       visited Pt. During rounding. Pt was open to conversation and very welcoming.  provided support and active listening and compassion.  Pt. Wanted to converse about family, denise and health issues and care. Prayer was provided for support , comfort and encouragement     Denise, Belief, Meaning:   Patient has beliefs or practices that help with coping during difficult times  Family/Friends have beliefs or practices that help with coping during difficult times      Importance and Influence:  Patient has spiritual/personal beliefs that influence decisions regarding their health  Family/Friends have spiritual/personal beliefs that influence decisions regarding the patient's health    Community:  Patient feels well-supported. Support system includes: Children and Extended family  Family/Friends feel well-supported. Support system includes: Extended family    Assessment and Plan of Care:     Patient Interventions include: Facilitated expression of thoughts and feelings and Explored spiritual coping/struggle/distress  Family/Friends Interventions include: Facilitated expression of thoughts and feelings    Patient Plan of Care: Spiritual Care available upon further referral  Family/Friends Plan of Care: Spiritual Care available upon further

## 2025-01-20 ENCOUNTER — APPOINTMENT (OUTPATIENT)
Dept: ULTRASOUND IMAGING | Age: 80
DRG: 445 | End: 2025-01-20
Payer: MEDICARE

## 2025-01-20 LAB
A1AT SERPL-MCNC: 167 MG/DL (ref 90–200)
ALBUMIN SERPL-MCNC: 3.3 G/DL (ref 3.5–5.2)
ALBUMIN/GLOB SERPL: 1.2 {RATIO} (ref 1–2.5)
ALP SERPL-CCNC: 183 U/L (ref 40–129)
ALT SERPL-CCNC: 68 U/L (ref 5–41)
ANION GAP SERPL CALCULATED.3IONS-SCNC: 11 MMOL/L (ref 9–17)
AST SERPL-CCNC: 35 U/L
BASOPHILS # BLD: 0.04 K/UL (ref 0–0.2)
BASOPHILS NFR BLD: 1 % (ref 0–2)
BILIRUB SERPL-MCNC: 2.3 MG/DL (ref 0.3–1.2)
BUN SERPL-MCNC: 14 MG/DL (ref 8–23)
CALCIUM SERPL-MCNC: 8.2 MG/DL (ref 8.6–10.4)
CERULOPLASMIN SERPL-MCNC: 24 MG/DL (ref 15–30)
CHLORIDE SERPL-SCNC: 106 MMOL/L (ref 98–107)
CMV IGG SERPL QL IA: 664
CMV IGM SERPL QL IA: 0.2
CO2 SERPL-SCNC: 21 MMOL/L (ref 20–31)
CREAT SERPL-MCNC: 0.8 MG/DL (ref 0.7–1.2)
EBV EA-D IGG SER-ACNC: 14 U/ML
EBV INTERPRETATION: ABNORMAL
EBV NA IGG SER IA-ACNC: 348 U/ML
EBV VCA IGG SER-ACNC: 817 U/ML
EBV VCA IGM SER-ACNC: 8 U/ML
EKG ATRIAL RATE: 77 BPM
EKG P AXIS: 5 DEGREES
EKG P-R INTERVAL: 186 MS
EKG Q-T INTERVAL: 400 MS
EKG QRS DURATION: 88 MS
EKG QTC CALCULATION (BAZETT): 452 MS
EKG R AXIS: -5 DEGREES
EKG T AXIS: 38 DEGREES
EKG VENTRICULAR RATE: 77 BPM
EOSINOPHIL # BLD: 0.15 K/UL (ref 0–0.4)
EOSINOPHILS RELATIVE PERCENT: 2 % (ref 1–4)
ERYTHROCYTE [DISTWIDTH] IN BLOOD BY AUTOMATED COUNT: 13.2 % (ref 12.5–15.4)
GFR, ESTIMATED: >90 ML/MIN/1.73M2
GLUCOSE BLD-MCNC: 125 MG/DL (ref 75–110)
GLUCOSE BLD-MCNC: 216 MG/DL (ref 75–110)
GLUCOSE BLD-MCNC: 219 MG/DL (ref 75–110)
GLUCOSE BLD-MCNC: 83 MG/DL (ref 75–110)
GLUCOSE BLD-MCNC: 94 MG/DL (ref 75–110)
GLUCOSE SERPL-MCNC: 85 MG/DL (ref 70–99)
HCT VFR BLD AUTO: 40.1 % (ref 41–53)
HGB BLD-MCNC: 13.9 G/DL (ref 13.5–17.5)
LYMPHOCYTES NFR BLD: 2.14 K/UL (ref 1–4.8)
LYMPHOCYTES RELATIVE PERCENT: 26 % (ref 24–44)
MCH RBC QN AUTO: 30.8 PG (ref 26–34)
MCHC RBC AUTO-ENTMCNC: 34.7 G/DL (ref 31–37)
MCV RBC AUTO: 88.7 FL (ref 80–100)
MICROORGANISM SPEC CULT: NO GROWTH
MONOCYTES NFR BLD: 0.78 K/UL (ref 0.1–1.2)
MONOCYTES NFR BLD: 9 % (ref 2–11)
NEUTROPHILS NFR BLD: 62 % (ref 36–66)
NEUTS SEG NFR BLD: 5.27 K/UL (ref 1.8–7.7)
PLATELET # BLD AUTO: 148 K/UL (ref 140–450)
PMV BLD AUTO: 10.8 FL (ref 8–14)
POTASSIUM SERPL-SCNC: 3.8 MMOL/L (ref 3.7–5.3)
PROT SERPL-MCNC: 6.1 G/DL (ref 6.4–8.3)
RBC # BLD AUTO: 4.52 M/UL (ref 4.5–5.9)
SERVICE CMNT-IMP: NORMAL
SODIUM SERPL-SCNC: 138 MMOL/L (ref 135–144)
SPECIMEN DESCRIPTION: NORMAL
WBC OTHER # BLD: 8.4 K/UL (ref 3.5–11)

## 2025-01-20 PROCEDURE — 1200000000 HC SEMI PRIVATE

## 2025-01-20 PROCEDURE — 6370000000 HC RX 637 (ALT 250 FOR IP): Performed by: INTERNAL MEDICINE

## 2025-01-20 PROCEDURE — 99232 SBSQ HOSP IP/OBS MODERATE 35: CPT | Performed by: INTERNAL MEDICINE

## 2025-01-20 PROCEDURE — 80053 COMPREHEN METABOLIC PANEL: CPT

## 2025-01-20 PROCEDURE — 2500000003 HC RX 250 WO HCPCS

## 2025-01-20 PROCEDURE — 36415 COLL VENOUS BLD VENIPUNCTURE: CPT

## 2025-01-20 PROCEDURE — 6360000002 HC RX W HCPCS

## 2025-01-20 PROCEDURE — 82947 ASSAY GLUCOSE BLOOD QUANT: CPT

## 2025-01-20 PROCEDURE — 76705 ECHO EXAM OF ABDOMEN: CPT

## 2025-01-20 PROCEDURE — APPSS30 APP SPLIT SHARED TIME 16-30 MINUTES: Performed by: NURSE PRACTITIONER

## 2025-01-20 PROCEDURE — 2580000003 HC RX 258

## 2025-01-20 PROCEDURE — 85025 COMPLETE CBC W/AUTO DIFF WBC: CPT

## 2025-01-20 RX ORDER — PANTOPRAZOLE SODIUM 40 MG/1
40 TABLET, DELAYED RELEASE ORAL
Status: DISCONTINUED | OUTPATIENT
Start: 2025-01-21 | End: 2025-01-22 | Stop reason: HOSPADM

## 2025-01-20 RX ADMIN — TAMSULOSIN HYDROCHLORIDE 0.4 MG: 0.4 CAPSULE ORAL at 09:00

## 2025-01-20 RX ADMIN — METOPROLOL SUCCINATE 50 MG: 50 TABLET, EXTENDED RELEASE ORAL at 09:00

## 2025-01-20 RX ADMIN — SERTRALINE HYDROCHLORIDE 25 MG: 25 TABLET ORAL at 09:00

## 2025-01-20 RX ADMIN — INSULIN LISPRO 2 UNITS: 100 INJECTION, SOLUTION INTRAVENOUS; SUBCUTANEOUS at 18:55

## 2025-01-20 RX ADMIN — MEMANTINE 5 MG: 5 TABLET ORAL at 09:00

## 2025-01-20 RX ADMIN — METRONIDAZOLE 500 MG: 500 INJECTION, SOLUTION INTRAVENOUS at 13:54

## 2025-01-20 RX ADMIN — Medication 1000 UNITS: at 09:00

## 2025-01-20 RX ADMIN — LOSARTAN POTASSIUM 50 MG: 50 TABLET, FILM COATED ORAL at 09:00

## 2025-01-20 RX ADMIN — SODIUM CHLORIDE: 9 INJECTION, SOLUTION INTRAVENOUS at 06:08

## 2025-01-20 RX ADMIN — INSULIN LISPRO 1 UNITS: 100 INJECTION, SOLUTION INTRAVENOUS; SUBCUTANEOUS at 22:46

## 2025-01-20 RX ADMIN — METRONIDAZOLE 500 MG: 500 INJECTION, SOLUTION INTRAVENOUS at 06:11

## 2025-01-20 RX ADMIN — ENOXAPARIN SODIUM 40 MG: 100 INJECTION SUBCUTANEOUS at 08:59

## 2025-01-20 RX ADMIN — MEMANTINE 5 MG: 5 TABLET ORAL at 21:22

## 2025-01-20 RX ADMIN — SODIUM CHLORIDE, PRESERVATIVE FREE 10 ML: 5 INJECTION INTRAVENOUS at 21:27

## 2025-01-20 RX ADMIN — FINASTERIDE 5 MG: 5 TABLET, FILM COATED ORAL at 09:00

## 2025-01-20 RX ADMIN — PANTOPRAZOLE SODIUM 40 MG: 40 INJECTION, POWDER, FOR SOLUTION INTRAVENOUS at 10:28

## 2025-01-20 NOTE — PROGRESS NOTES
General Surgery:  Consult Note        PATIENT NAME: Robert Connolly   YOB: 1945    ADMISSION DATE: 1/18/2025  3:09 PM      TODAY'S DATE: 1/20/2025    Chief Complaint: Falls, fatigue  Consult Regarding: Mild gallbladder inflammation, elevated hepatic function panel    Subjective:  The patient was seen examined at bedside, no overnight events.  Patient continues to deny abdominal pain.  He reports difficulty with urination at baseline as his biggest abdominal concern but reports he has never seen a urologist.  He remains VSS, afebrile      PHYSICAL EXAM:    VITALS:  /66   Pulse 72   Temp 98.1 °F (36.7 °C) (Oral)   Resp 16   Ht 1.727 m (5' 8\")   Wt 72.6 kg (160 lb)   SpO2 96%   BMI 24.33 kg/m²   INTAKE/OUTPUT:     Intake/Output Summary (Last 24 hours) at 1/20/2025 0833  Last data filed at 1/19/2025 2333  Gross per 24 hour   Intake --   Output 600 ml   Net -600 ml       CONSTITUTIONAL:  awake, alert, not distressed  HEENT: Normocephalic/atraumatic, without obvious abnormality.  NECK:  Supple, symmetrical, trachea midline   CARDIOVASCULAR: Regular rate and rhythm  LUNGS: Unlabored breathing on RA  ABDOMEN: Soft, nontender, nondistended  MUSCULOSKELETAL: Muscle strength intact in all extremities bilaterally.  NEUROLOGIC: Gross motor intact without focal weakness.  SKIN: No cyanosis, rashes, or edema noted.  Does appear mildly jaundiced    CBC with Differential:    Lab Results   Component Value Date/Time    WBC 8.4 01/20/2025 07:11 AM    RBC 4.52 01/20/2025 07:11 AM    HGB 13.9 01/20/2025 07:11 AM    HCT 40.1 01/20/2025 07:11 AM     01/20/2025 07:11 AM    MCV 88.7 01/20/2025 07:11 AM    MCH 30.8 01/20/2025 07:11 AM    MCHC 34.7 01/20/2025 07:11 AM    RDW 13.2 01/20/2025 07:11 AM    LYMPHOPCT 26 01/20/2025 07:11 AM    MONOPCT 9 01/20/2025 07:11 AM    EOSPCT 2 01/20/2025 07:11 AM    BASOPCT 1 01/20/2025 07:11 AM    MONOSABS 0.78 01/20/2025 07:11 AM    LYMPHSABS 2.14 01/20/2025 07:11 AM     general surgeon when he gets home.      Electronically signed by Chandu Noyola IV, DO  on 1/20/2025 at 12:30 PM

## 2025-01-20 NOTE — PROGRESS NOTES
[facility]  Office: [phone]  Manuel Juarez DO, Paul Sharp DO, Joni Joseph DO, Stephen Romero DO, Aleksandr Barnes MD, Trinh Landry MD, Soren Meeks MD, Rebecca De Luna MD,  Hipolito Cantu MD, Do Boyd MD, Jn Amaya MD,  Nicole Pandey DO, Maranda Boswell MD, Young Srivastava MD, Zbigniew Juarez DO, Estephanie Milton MD,  Riley Barnes DO, Kiersten Vickers MD, Noris Couch MD, Rebecca Rebolledo MD, Ady Farfan MD,  Christophe Goodson MD, Randy Tariq MD, Paz Coon MD, Aye Jacobson MD, Nikolas Juarez MD, Caitlin Helms MD, Marin Butcher DO, Arnie Young MD, Nicole Bai MD, Mohsin Reza, MD, Shirley Waterhouse, CNP,  Linh Singh CNP, Marin Bryan, CNP,  Marcelina Zhang, DNP, Renu Romero, CNP, Marcella Jean, CNP, Lynnette Cameron, CNP, Sara Downs, CNP, Mellisa Izaguirre, PA-C, Kerri Marques, PA-C, Araceli Block, CNP, Jose Jacobs, CNP,  Apoorva Aden, CNP, Andreea Nunez, CNP, Aleyda Huffman, CNP,  Misa Yung, CNS, Ana Silva, CNP, Petra Henry, CNP,   Sanjuana Galicia, CNP         [facility]   IN-PATIENT SERVICE   [facility]    Progress Note    1/20/2025    7:05 AM    Name:   Robert Connolly  MRN:     [id number]     Acct:      [id number]   Room:   [facility]/311-01   Day:  2  Admit Date:  1/18/2025  3:09 PM    PCP:   No primary care provider on file.  Code Status:  Full Code    Subjective:     C/C:   Chief Complaint   Patient presents with    Fatigue    Fall     Pt arrives with co falling and leg weakness.Pt states he has been here for approx 5 mths from [place] and is due to go back on the 1/29/25. Per pt his family is concerned with his falling and would like to make sure he doesn't have clots in his legs before traveling.      Interval History Status: improved.     Patient remains pain-free, LFTs improving.  Denies chest pain, shortness of breath, nausea or vomiting, fevers or chills or acute complaints    Brief History:     This  evaluation. 4. Nonobstructive right nephrolithiasis. 5. Severe atherosclerotic disease.     XR CHEST (SINGLE VIEW FRONTAL)    Result Date: 1/18/2025  No acute cardiopulmonary process.     CT HEAD WO CONTRAST    Result Date: 1/18/2025  1. No acute intracranial abnormality. 2. Mild diffuse cerebral atrophy. Mild chronic small vessel ischemic changes.       Physical Examination:     General appearance:  alert, cooperative and no distress  Mental Status:  oriented to person, place and time and normal affect  Lungs:  clear to auscultation bilaterally, normal effort  Heart:  regular rate and rhythm, no murmur  Abdomen:  soft, nontender, nondistended, normal bowel sounds, no masses, hepatomegaly, splenomegaly  Extremities:  no edema, redness, tenderness in the calves  Skin:  no gross lesions, rashes, induration    Assessment:     Hospital Problems             Last Modified POA    * (Principal) Symptomatic cholelithiasis 1/18/2025 Yes    Diabetes mellitus type 2, controlled (HCC) 1/19/2025 Yes    Primary hypertension 1/19/2025 Yes    BPH (benign prostatic hyperplasia) 1/19/2025 Yes    Dyslipidemia 1/19/2025 Yes    Elevated LFTs 1/19/2025 Yes    Cholecystitis 1/19/2025 Yes       Plan:     LFTs trending downward  GI and surgery evaluations in progress  Gallbladder ultrasound completed, benign exam, no ductal dilation, cholelithiasis or gallbladder wall thickening  Insulin scale for glycemic control  PT and OT  GI DVT prophylaxis  Serological studies and prior, alpha-1 antitrypsin within normal limits, ceruloplasmin within normal limit.  EBV IgG positive, IgM negative, likely past infection  See orders for detail    Joni Joseph DO  1/20/2025  7:05 AM

## 2025-01-20 NOTE — PROGRESS NOTES
GI Progress notes    1/20/2025   9:57 AM    Name:  Robert Connolly  MRN:    [id number]     Acct:     [id number]   Room:  [address] [place] [address]-[facility] Day: 2     Admit Date: 1/18/2025  3:09 PM  PCP: No primary care provider on file.    Subjective:     C/C:   Chief Complaint   Patient presents with    Fatigue    Fall     Pt arrives with co falling and leg weakness.Pt states he has been here for approx 5 mths from [place] and is due to go back on the 1/29/25. Per pt his family is concerned with his falling and would like to make sure he doesn't have clots in his legs before traveling.        Interval History: Status: not changed.     Patient seen and examined.  No acute events overnight.  RUQ US showed borderline hepatomegaly w/ diffuse fatty infiltration of the liver.    GB was unremarkable without evidence of pericholecystic fluid, wall thickening or stone.  CBD WNL, 6 mm.  LFTs downtrending  Acute hepatitis panel nonreactive  CMV negative  EBV pending    ROS:  Constitutional: negative for chills, fevers and sweats  Respiratory: negative for cough, dyspnea on exertion, hemoptysis, shortness of breath and wheezing  Cardiovascular: negative for chest pain, chest pressure/discomfort, dyspnea, lower extremity edema and palpitations  Gastrointestinal: negative for abdominal pain, constipation, diarrhea, nausea and vomiting  Neurological: negative for dizziness and headaches    Medications:     Allergies:   Allergies   Allergen Reactions    Cyclobenzaprine Other (See Comments)     Other Reaction(s): inability to raise arms, inability to walk    unable to take any muscle relaxants    Penicillins        Current Meds: phenol 1.4 % mouth spray 1 spray, Q2H PRN  metoprolol succinate (TOPROL XL) extended release tablet 50 mg, Daily  finasteride (PROSCAR) tablet 5 mg, Daily  [Held by provider] empagliflozin (JARDIANCE) tablet 10 mg, Daily  losartan (COZAAR) tablet 50 mg, Daily  sertraline (ZOLOFT) tablet 25 mg, Daily  tamsulosin (FLOMAX)  capsule 0.4 mg, Daily  memantine (NAMENDA) tablet 5 mg, BID  Vitamin D (CHOLECALCIFEROL) tablet 1,000 Units, Daily  insulin lispro (HUMALOG,ADMELOG) injection vial 0-4 Units, 4x Daily AC & HS  glucose chewable tablet 16 g, PRN  dextrose bolus 10% 125 mL, PRN   Or  dextrose bolus 10% 250 mL, PRN  glucagon injection 1 mg, PRN  dextrose 10 % infusion, Continuous PRN  [Held by provider] metFORMIN (GLUCOPHAGE) tablet 500 mg, BID WC  [Held by provider] alogliptin (NESINA) tablet 12.5 mg, Daily  dextrose 5 % and 0.45 % sodium chloride infusion, Continuous  sodium chloride flush 0.9 % injection 10 mL, PRN  sodium chloride 0.9 % bolus 80 mL, Once  sodium chloride flush 0.9 % injection 5-40 mL, 2 times per day  sodium chloride flush 0.9 % injection 10 mL, PRN  0.9 % sodium chloride infusion, PRN  potassium chloride (KLOR-CON M) extended release tablet 40 mEq, PRN   Or  potassium bicarb-citric acid (EFFER-K) effervescent tablet 40 mEq, PRN   Or  potassium chloride 10 mEq/100 mL IVPB (Peripheral Line), PRN  magnesium sulfate 2000 mg in 50 mL IVPB premix, PRN  ondansetron (ZOFRAN-ODT) disintegrating tablet 4 mg, Q8H PRN   Or  ondansetron (ZOFRAN) injection 4 mg, Q6H PRN  acetaminophen (TYLENOL) tablet 650 mg, Q6H PRN   Or  acetaminophen (TYLENOL) suppository 650 mg, Q6H PRN  polyethylene glycol (GLYCOLAX) packet 17 g, Daily PRN  metroNIDAZOLE (FLAGYL) 500 mg in 0.9% NaCl 100 mL IVPB premix, Q8H  pantoprazole (PROTONIX) 40 mg in sodium chloride (PF) 0.9 % 10 mL injection, Daily  enoxaparin (LOVENOX) injection 40 mg, Daily  morphine (PF) injection 1 mg, Q4H PRN        Data:     Code Status:  Full Code    Family History   Problem Relation Age of Onset    Cancer Mother        Social History     Socioeconomic History    Marital status:      Spouse name: Not on file    Number of children: Not on file    Years of education: Not on file    Highest education level: Not on file   Occupational History    Not on file   Tobacco Use

## 2025-01-21 PROBLEM — F03.90 RAPIDLY PROGRESSIVE DEMENTIA (HCC): Status: ACTIVE | Noted: 2025-01-21

## 2025-01-21 PROBLEM — R74.8 ELEVATED LIVER ENZYMES: Status: ACTIVE | Noted: 2025-01-21

## 2025-01-21 LAB
ALBUMIN SERPL-MCNC: 3.4 G/DL (ref 3.5–5.2)
ALBUMIN/GLOB SERPL: 1.2 {RATIO} (ref 1–2.5)
ALP SERPL-CCNC: 197 U/L (ref 40–129)
ALT SERPL-CCNC: 54 U/L (ref 5–41)
ANION GAP SERPL CALCULATED.3IONS-SCNC: 11 MMOL/L (ref 9–17)
AST SERPL-CCNC: 30 U/L
BASOPHILS # BLD: 0.03 K/UL (ref 0–0.2)
BASOPHILS NFR BLD: 0 % (ref 0–2)
BILIRUB SERPL-MCNC: 1.4 MG/DL (ref 0.3–1.2)
BUN SERPL-MCNC: 16 MG/DL (ref 8–23)
CALCIUM SERPL-MCNC: 8.5 MG/DL (ref 8.6–10.4)
CHLORIDE SERPL-SCNC: 105 MMOL/L (ref 98–107)
CO2 SERPL-SCNC: 23 MMOL/L (ref 20–31)
CREAT SERPL-MCNC: 0.9 MG/DL (ref 0.7–1.2)
EOSINOPHIL # BLD: 0.17 K/UL (ref 0–0.4)
EOSINOPHILS RELATIVE PERCENT: 2 % (ref 1–4)
ERYTHROCYTE [DISTWIDTH] IN BLOOD BY AUTOMATED COUNT: 13.3 % (ref 12.5–15.4)
GFR, ESTIMATED: 87 ML/MIN/1.73M2
GLUCOSE BLD-MCNC: 163 MG/DL (ref 75–110)
GLUCOSE BLD-MCNC: 290 MG/DL (ref 75–110)
GLUCOSE BLD-MCNC: 300 MG/DL (ref 75–110)
GLUCOSE BLD-MCNC: 332 MG/DL (ref 75–110)
GLUCOSE SERPL-MCNC: 175 MG/DL (ref 70–99)
HCT VFR BLD AUTO: 41 % (ref 41–53)
HGB BLD-MCNC: 14.6 G/DL (ref 13.5–17.5)
LIPASE SERPL-CCNC: 270 U/L (ref 13–60)
LYMPHOCYTES NFR BLD: 2.51 K/UL (ref 1–4.8)
LYMPHOCYTES RELATIVE PERCENT: 28 % (ref 24–44)
MCH RBC QN AUTO: 31.4 PG (ref 26–34)
MCHC RBC AUTO-ENTMCNC: 35.6 G/DL (ref 31–37)
MCV RBC AUTO: 88.2 FL (ref 80–100)
MONOCYTES NFR BLD: 1 K/UL (ref 0.1–1.2)
MONOCYTES NFR BLD: 11 % (ref 2–11)
NEUTROPHILS NFR BLD: 59 % (ref 36–66)
NEUTS SEG NFR BLD: 5.41 K/UL (ref 1.8–7.7)
PLATELET # BLD AUTO: 164 K/UL (ref 140–450)
PMV BLD AUTO: 10.6 FL (ref 8–14)
POTASSIUM SERPL-SCNC: 4.3 MMOL/L (ref 3.7–5.3)
PROT SERPL-MCNC: 6.3 G/DL (ref 6.4–8.3)
RBC # BLD AUTO: 4.65 M/UL (ref 4.5–5.9)
SODIUM SERPL-SCNC: 139 MMOL/L (ref 135–144)
WBC OTHER # BLD: 9.1 K/UL (ref 3.5–11)

## 2025-01-21 PROCEDURE — 6370000000 HC RX 637 (ALT 250 FOR IP): Performed by: INTERNAL MEDICINE

## 2025-01-21 PROCEDURE — 83690 ASSAY OF LIPASE: CPT

## 2025-01-21 PROCEDURE — 80053 COMPREHEN METABOLIC PANEL: CPT

## 2025-01-21 PROCEDURE — 36415 COLL VENOUS BLD VENIPUNCTURE: CPT

## 2025-01-21 PROCEDURE — 85025 COMPLETE CBC W/AUTO DIFF WBC: CPT

## 2025-01-21 PROCEDURE — 97166 OT EVAL MOD COMPLEX 45 MIN: CPT

## 2025-01-21 PROCEDURE — 99232 SBSQ HOSP IP/OBS MODERATE 35: CPT | Performed by: NURSE PRACTITIONER

## 2025-01-21 PROCEDURE — 6360000002 HC RX W HCPCS

## 2025-01-21 PROCEDURE — 97162 PT EVAL MOD COMPLEX 30 MIN: CPT

## 2025-01-21 PROCEDURE — 97116 GAIT TRAINING THERAPY: CPT

## 2025-01-21 PROCEDURE — 99232 SBSQ HOSP IP/OBS MODERATE 35: CPT | Performed by: STUDENT IN AN ORGANIZED HEALTH CARE EDUCATION/TRAINING PROGRAM

## 2025-01-21 PROCEDURE — 97535 SELF CARE MNGMENT TRAINING: CPT

## 2025-01-21 PROCEDURE — 1200000000 HC SEMI PRIVATE

## 2025-01-21 PROCEDURE — 82947 ASSAY GLUCOSE BLOOD QUANT: CPT

## 2025-01-21 PROCEDURE — 2500000003 HC RX 250 WO HCPCS

## 2025-01-21 RX ADMIN — METOPROLOL SUCCINATE 50 MG: 50 TABLET, EXTENDED RELEASE ORAL at 09:30

## 2025-01-21 RX ADMIN — SODIUM CHLORIDE, PRESERVATIVE FREE 10 ML: 5 INJECTION INTRAVENOUS at 21:50

## 2025-01-21 RX ADMIN — FINASTERIDE 5 MG: 5 TABLET, FILM COATED ORAL at 09:29

## 2025-01-21 RX ADMIN — INSULIN LISPRO 3 UNITS: 100 INJECTION, SOLUTION INTRAVENOUS; SUBCUTANEOUS at 21:48

## 2025-01-21 RX ADMIN — PANTOPRAZOLE SODIUM 40 MG: 40 TABLET, DELAYED RELEASE ORAL at 06:15

## 2025-01-21 RX ADMIN — SERTRALINE HYDROCHLORIDE 25 MG: 25 TABLET ORAL at 09:29

## 2025-01-21 RX ADMIN — MEMANTINE 5 MG: 5 TABLET ORAL at 21:47

## 2025-01-21 RX ADMIN — INSULIN LISPRO 3 UNITS: 100 INJECTION, SOLUTION INTRAVENOUS; SUBCUTANEOUS at 17:41

## 2025-01-21 RX ADMIN — LOSARTAN POTASSIUM 50 MG: 50 TABLET, FILM COATED ORAL at 09:29

## 2025-01-21 RX ADMIN — Medication 1000 UNITS: at 09:29

## 2025-01-21 RX ADMIN — MEMANTINE 5 MG: 5 TABLET ORAL at 09:29

## 2025-01-21 RX ADMIN — ENOXAPARIN SODIUM 40 MG: 100 INJECTION SUBCUTANEOUS at 09:30

## 2025-01-21 RX ADMIN — TAMSULOSIN HYDROCHLORIDE 0.4 MG: 0.4 CAPSULE ORAL at 09:29

## 2025-01-21 RX ADMIN — INSULIN LISPRO 2 UNITS: 100 INJECTION, SOLUTION INTRAVENOUS; SUBCUTANEOUS at 13:31

## 2025-01-21 NOTE — PLAN OF CARE
Problem: Safety - Adult  Goal: Free from fall injury  1/21/2025 1344 by Karen Esparza RN  Outcome: Progressing  1/21/2025 0412 by Talia Wynne RN  Outcome: Progressing     Problem: Discharge Planning  Goal: Discharge to home or other facility with appropriate resources  1/21/2025 1344 by Karen Esparza RN  Outcome: Progressing  1/21/2025 0412 by Talia Wynne RN  Outcome: Progressing     Problem: Musculoskeletal - Adult  Goal: Return mobility to safest level of function  1/21/2025 1344 by Karen Esparza RN  Outcome: Progressing  1/21/2025 0412 by Talia Wynne RN  Outcome: Progressing     Problem: Infection - Adult  Goal: Absence of infection at discharge  1/21/2025 1344 by Karen Esparza RN  Outcome: Progressing  1/21/2025 0412 by Talia Wynne RN  Outcome: Progressing     Problem: Hematologic - Adult  Goal: Maintains hematologic stability  1/21/2025 1344 by Karen Esparza RN  Outcome: Progressing  1/21/2025 0412 by Talia Wynne RN  Outcome: Progressing     Problem: Chronic Conditions and Co-morbidities  Goal: Patient's chronic conditions and co-morbidity symptoms are monitored and maintained or improved  1/21/2025 1344 by Karen Esparza RN  Outcome: Progressing  1/21/2025 0412 by Talia Wynne RN  Outcome: Progressing

## 2025-01-21 NOTE — PROGRESS NOTES
Spiritual Health History and Assessment/Progress Note  [facility]    (P) Follow-up, Spiritual/Emotional Needs, Emotional distress, (P) Adjustment to illness, Life Adjustments,      Name: Robert Connolly MRN: [id number]    Age: 79 y.o.     Sex: male   Language: English   Gnosticist: None   Symptomatic cholelithiasis     Date: 1/20/2025            Total Time Calculated: (P) 20 min              Spiritual Assessment continued in [address]        Referral/Consult From: (P) Rounding   Encounter Overview/Reason: (P) Follow-up, Spiritual/Emotional Needs  Service Provided For: (P) Patient     followed up on Pt during rounding.. Pt. Was feeling better but was frustrated and expressed disappointment in schedule change in procedures. Pt was open to conversation and friendly.  offered prayer as requested for support, comfort and strength.  provided active listening and  encouragement. Good visit.    Denise, Belief, Meaning:   Patient has beliefs or practices that help with coping during difficult times  Family/Friends No family/friends present      Importance and Influence:  Patient has spiritual/personal beliefs that influence decisions regarding their health  Family/Friends No family/friends present    Community:  Patient feels well-supported. Support system includes: Spouse/Partner and Children  Family/Friends No family/friends present    Assessment and Plan of Care:     Patient Interventions include: Facilitated expression of thoughts and feelings and Explored spiritual coping/struggle/distress  Family/Friends Interventions include: No family/friends present    Patient Plan of Care: Spiritual Care available upon further referral  Family/Friends Plan of Care: No family/friends present    Electronically signed by Chaplain ALEXA on 1/20/2025 at 8:17 PM    01/20/25 2014   Encounter Summary   Encounter Overview/Reason Follow-up;Spiritual/Emotional Needs   Service Provided For

## 2025-01-21 NOTE — CARE COORDINATION
Case Management Assessment  Initial Evaluation    Date/Time of Evaluation: 1/21/2025 3:33 PM  Assessment Completed by: Deidra Cortez RN    If patient is discharged prior to next notation, then this note serves as note for discharge by case management.    Patient Name: Robert Connolly                   YOB: 1945  Diagnosis: Cholecystitis [K81.9]  Bilateral pleural effusion [J90]  Elevated liver enzymes [R74.8]  Symptomatic cholelithiasis [K80.20]  Fall from bed, initial encounter [W06.XXXA]                   Date / Time: 1/18/2025  3:09 PM    Patient Admission Status: Inpatient   Readmission Risk (Low < 19, Mod (19-27), High > 27): Readmission Risk Score: 9.6    Current PCP: No primary care provider on file.  PCP verified by CM? Yes    Chart Reviewed: Yes      History Provided by: Patient, Child/Family  Patient Orientation: Alert and Oriented, Person, Place    Patient Cognition: Dementia / Early Alzheimer's    Hospitalization in the last 30 days (Readmission):  No    If yes, Readmission Assessment in  Navigator will be completed.    Advance Directives:      Code Status: Full Code   Patient's Primary Decision Maker is: Legal Next of Kin      Discharge Planning:    Patient lives with: Spouse/Significant Other Type of Home: House  Primary Care Giver: Self  Patient Support Systems include: Spouse/Significant Other, Children, Family Members   Current Financial resources: Medicare  Current community resources: None  Current services prior to admission: Durable Medical Equipment            Current DME: Glucometer            Type of Home Care services:  None    ADLS  Prior functional level: Assistance with the following:, Other (see comment) (family assist with medications)  Current functional level: Assistance with the following:, Other (see comment)    PT AM-PAC: 20 /24  OT AM-PAC: 21 /24    Family can provide assistance at DC: Yes  Would you like Case Management to discuss the discharge plan with any  data associated with the providers was provided to:     Patient Representative Name:       The Patient and/or Patient Representative Agree with the Discharge Plan?  y    Deidra Cortez RN  Case Management Department  Ph:  Fax:      GENERAL:  42y/o Female NAD, resting comfortably.  HEAD:  Atraumatic, Normocephalic  EYES: EOMI, PERRLA, conjunctiva and sclera clear  NECK: Supple, No JVD, no cervical lymphadenopathy, non-tender  CHEST/LUNG: Clear to auscultation bilaterally; No wheeze, rhonchi, or rales  HEART: Regular rate and rhythm; S1&S2  ABDOMEN: Soft, Nontender, Nondistended x 4 quadrants; Bowel sounds present  EXTREMITIES:   Peripheral Pulses Present, No clubbing, no cyanosis, or no edema, no calf tenderness  PSYCH: AAOx3, cooperative, appropriate  NEUROLOGY: WNL  SKIN: WNL

## 2025-01-21 NOTE — PLAN OF CARE
Problem: Safety - Adult  Goal: Free from fall injury  Outcome: Progressing     Problem: Discharge Planning  Goal: Discharge to home or other facility with appropriate resources  Outcome: Progressing     Problem: Musculoskeletal - Adult  Goal: Return mobility to safest level of function  Outcome: Progressing     Problem: Chronic Conditions and Co-morbidities  Goal: Patient's chronic conditions and co-morbidity symptoms are monitored and maintained or improved  Outcome: Progressing

## 2025-01-21 NOTE — PROGRESS NOTES
GI Progress notes    1/21/2025   8:41 AM    Name:  Robert Connolly  MRN:    [id number]     Acct:     [id number]   Room:  [address] [place] [address]-[facility] Day: 3     Admit Date: 1/18/2025  3:09 PM  PCP: No primary care provider on file.    Subjective:     C/C:   Chief Complaint   Patient presents with    Fatigue    Fall     Pt arrives with co falling and leg weakness.Pt states he has been here for approx 5 mths from [place] and is due to go back on the 1/29/25. Per pt his family is concerned with his falling and would like to make sure he doesn't have clots in his legs before traveling.        Interval History: Status: improved.     Patient seen and examined.  No acute events overnight.  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    ROS:  Constitutional: negative for chills, fevers and sweats  Respiratory: negative for cough, dyspnea on exertion, hemoptysis, shortness of breath and wheezing  Cardiovascular: negative for chest pain, chest pressure/discomfort, dyspnea, lower extremity edema and palpitations  Gastrointestinal: negative for abdominal pain, constipation, diarrhea, nausea and vomiting  Neurological: negative for dizziness and headaches    Medications:     Allergies:   Allergies   Allergen Reactions    Cyclobenzaprine Other (See Comments)     Other Reaction(s): inability to raise arms, inability to walk    unable to take any muscle relaxants    Penicillins        Current Meds: phenol 1.4 % mouth spray 1 spray, Q2H PRN  pantoprazole (PROTONIX) tablet 40 mg, QAM AC  metoprolol succinate (TOPROL XL) extended release tablet 50 mg, Daily  finasteride (PROSCAR) tablet 5 mg, Daily  [Held by provider] empagliflozin (JARDIANCE) tablet 10 mg, Daily  losartan (COZAAR) tablet 50 mg, Daily  sertraline (ZOLOFT) tablet 25 mg, Daily  tamsulosin (FLOMAX) capsule 0.4 mg, Daily  memantine (NAMENDA) tablet 5 mg, BID  Vitamin D (CHOLECALCIFEROL) tablet 1,000      Primary Problem  Symptomatic cholelithiasis     Active Hospital Problems    Diagnosis Date Noted    Elevated LFTs [R79.89] 01/19/2025    Cholecystitis [K81.9] 01/19/2025    Diabetes mellitus type 2, controlled (HCC) [E11.9]     Primary hypertension [I10]     BPH (benign prostatic hyperplasia) [N40.0]     Dyslipidemia [E78.5]     Symptomatic cholelithiasis [K80.20] 01/18/2025     Past Medical History:   Diagnosis Date    BPH (benign prostatic hyperplasia)     Diabetes mellitus type 2, controlled (HCC)     Dyslipidemia     Hypertension         Plan:        Elevated LFTs, mild inflammation of GB on CT  Denies abdominal pain, nausea, vomiting  Acute hepatitis panel non reactive  CMV negative  GB US hepatomegaly, fatty infiltration.  No cholelithiasis, pericholecystic fluid. 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 Montero    Explained to the patient and d/W Nursing Staff  Will F/U with you  Please call or Page for any issues or change in status  Thanks    This note is created with the assistance of the speech recognition program.  While intending to generate a document that actually reflects the content of the visit, document can still have some errors including those of syntax and sound like substitutions which may escape proof reading.  Actual meaning can be extrapolated by contextual diversion.    Electronically signed by CHARISSE Yadav NP on 1/21/2025 at 8:41 AM

## 2025-01-21 NOTE — PROGRESS NOTES
Physical Therapy  Facility/Department: [address]   Physical Therapy Initial Evaluation    Patient Name: Robert Connolly        MRN: [id number]    : 1945    Date of Service: 2025    Chief Complaint   Patient presents with    Fatigue    Fall     Pt arrives with co falling and leg weakness.Pt states he has been here for approx 5 mths from [place] and is due to go back on the 25. Per pt his family is concerned with his falling and would like to make sure he doesn't have clots in his legs before traveling.      Past Medical History:  has a past medical history of BPH (benign prostatic hyperplasia), Diabetes mellitus type 2, controlled (HCC), Dyslipidemia, and Hypertension.  Past Surgical History:  has a past surgical history that includes Cataract extraction.    Discharge Recommendations  Discharge Recommendations: Patient would benefit from continued therapy after discharge  PT Equipment Recommendations  Equipment Needed: No    Assessment  Body Structures, Functions, Activity Limitations Requiring Skilled Therapeutic Intervention: Decreased functional mobility , Decreased endurance, Decreased balance, Decreased safe awareness    Assessment: Pt presents with multiple falls, chest pressure and cholelisthiasis. At baseline, pt lives with wife in [place], pt ambulates independently no device, ind ADL's and drives. Pt states he travels alone on planes. Pt currently demonstrating independence with bed mobility, supervision transfers, pt ambulated 300ft no device SBA, negotiated 2 steps with min assist for slight LOB. Pt c/o dizziness with looking down during mobility and noted slight consistent posterior lean. Noted difficulty with speech (pt reports chronic and progressively getting worse). Recommended for pt to not travel alone. Pt demonstrates adequate safety for return to prior living situation, however, recommend 24 hour supervision at this time. Pt will benefit from continued skilled PT for  (degrees)  RLE AROM: WNL  AROM LLE (degrees)  LLE AROM : WNL          Strength RLE  Strength RLE: WNL  Strength LLE  Strength LLE: WNL    Mobility   Bed mobility  Supine to Sit: Independent  Scooting: Independent  Bed Mobility Comments: bed flat         Transfers  Sit to Stand: Supervision  Stand to Sit: Supervision    Ambulation  Surface: Level tile  Device: No Device  Assistance: Stand by assistance  Quality of Gait: Slow jayla. Noted weight on heels and occasional prolonged stance time. Pt has tendency to reach for railing in hallway (states he has them at home in [place])  Gait Deviations: Decreased step height  Distance: 300ft    Stairs/Curb  Stairs?: Yes  Stairs  # Steps : 2  Stairs Height: 6\"  Rails:  (ascended with simulated doorframe, descended no rails)  Assistance: Minimal assistance  Comment: LOB while descending          Balance   Balance  Posture: Good  Sitting - Static: Good  Sitting - Dynamic: Good  Standing - Static: Fair;-  Standing - Dynamic: Poor;+  Comments: Marcing in place CGA, static stance eyes closed CGA. SLS min with LLE requiring more assist for balance than RLE           Patient Education  Patient Education  Education Given To: Patient  Education Provided: Role of Therapy;Plan of Care;Mobility Training;Fall Prevention Strategies;Transfer Training  Education Method: Demonstration;Verbal  Barriers to Learning: Cognition  Education Outcome: Verbalized understanding;Demonstrated understanding;Continued education needed    Plan  Physical Therapy Plan  General Plan: 3-5 times per week  Current Treatment Recommendations: Balance training, Gait training, Stair training, Functional mobility training, Transfer training, Safety education & training, Therapeutic activities, Patient/Caregiver education & training, Endurance training, Home exercise program, Cognitive reorientation    Goals  Patient Goals   Patient Goals : to go back to [place]  Short Term Goals  Time Frame for Short Term

## 2025-01-21 NOTE — PROGRESS NOTES
Occupational Therapy  Occupational Therapy Initial Evaluation  Facility/Department: [address]   Patient Name: Robert Connolly        MRN: [id number]    : 1945    Date of Service: 2025    Chief Complaint   Patient presents with    Fatigue    Fall     Pt arrives with co falling and leg weakness.Pt states he has been here for approx 5 mths from [place] and is due to go back on the 25. Per pt his family is concerned with his falling and would like to make sure he doesn't have clots in his legs before traveling.      Past Medical History:  has a past medical history of BPH (benign prostatic hyperplasia), Diabetes mellitus type 2, controlled (HCC), Dyslipidemia, and Hypertension.  Past Surgical History:  has a past surgical history that includes Cataract extraction.    Discharge Recommendations     OT Equipment Recommendations  Other: continue to assess/monitor    Assessment  Performance deficits / Impairments: Decreased functional mobility ;Decreased ADL status;Decreased balance  Assessment: Patient demonstrated decreased ADLs, balance and functional mobility following hospitalization due to falls, chest discomfort, cholelithiasis. Patient prior to admit was independent with ADLs/IADLs and ambulated without device. At this time patient is completing functional transfers with supervision, ambulating with SBA without device and supervision-IND for ADLs. Patient would benefit from skilled OT services addressing above deficits while here at hospital to maximize independence in prep for safe return to prior living arranagements. Patient currently did not demonstrate safe ability to travel independently back to [place].  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[employer], race cars  Leisure & Hobbies: travel, family    Vision/Hearing  Vision  Vision: Within Functional Limits  Hearing  Hearing: Within functional limits    BUE Assessment  Gross Assessment  AROM: Within functional limits  Strength: Within functional limits  Coordination: Within functional limits (able to bring each index finger to nose, able to oppose all digits to thumb)  Hand Dominance: Right     Objective  Orientation  Overall Orientation Status: Impaired  Orientation Level: Oriented to place;Oriented to situation;Oriented to person (disoriented to year)  Cognition  Overall Cognitive Status: Exceptions  Arousal/Alertness: Appears intact  Following Commands: Appears intact  Attention Span: Appears intact  Memory: Appears intact  Safety Judgement: Decreased awareness of need for safety;Decreased awareness of need for assistance  Problem Solving: Decreased awareness of errors  Insights: Decreased awareness of deficits  Initiation: Appears intact  Sequencing: Requires cues for some    Activities of Daily Living  Feeding: Independent  Grooming: Independent  Grooming Skilled Clinical Factors: standing at sink to wash hands,bursh teeth, comb hair  UE Bathing: Supervision;Based on clinical judgement  LE Bathing: Supervision;Based on clinical judgement  UE Dressing: Independent;Based on clinical judgement  LE Dressing: Independent  LE Dressing Skilled Clinical Factors: underware  Putting On/Taking Off Footwear: Independent;Based on clinical judgement  Toileting: Supervision;Based on clinical judgement    Balance  Balance  Sitting: Intact  Standing: Impaired (static-good, dymanic-SBA)    Transfers/Mobility  Bed mobility  Supine to

## 2025-01-21 NOTE — CARE COORDINATION
CM spoke with daughter, Desiree, updated that PT/OT feels that patient is unsafe to fly home independently. 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 Lives in [place]  The plan per Desiree is for her brother who resides in [place]  to buy her a ticket to fly with patient back to [place], Patient's brother Yvon, is talking with the son to coordinate.  Patient has a return ticket on Jan 29 @ 12noon.

## 2025-01-21 NOTE — PROGRESS NOTES
[facility]  Office: [phone]  Manuel Juarez DO, Paul Sharp DO, Joni Joseph DO, Stephen Romero DO, Aleksandr Barnes MD, Trinh Landry MD, Soren Meeks MD, Rebecca De Luna MD,  Hipolito Cantu MD, Do Boyd MD, Jn Amaya MD,  Nicole Pandey DO, Maranda Boswell MD, Young Srivastava MD, Zbigniew Juarez DO, Estephanie Milton MD,  Riley Barnes DO, Kiersten Vickers MD, Noris Couch MD, Rebecca Rebolledo MD, Ady Farfan MD,  Christophe Goodson MD, Randy Tariq MD, Paz Coon MD, Aye Jacobson MD, Nikolas Juarez MD, Caitlin Helms MD, Marin Butcher DO, Arnie Young MD, Nicole Bai MD, Mohsin Reza, MD, Shirley Waterhouse, CNP,  Linh Singh CNP, Marin Bryan, CNP,  Marcelina Zhang, DNP, Renu Romero, CNP, Marcella Jean, CNP, Lynnette Cameron, CNP, Sara Downs, CNP, Mellisa Izaguirre, PA-C, Kerri Marques, PA-C, Araceli Block, CNP, Jose Jacobs, CNP,  Apoorva Aden, CNP, Andreea Nunez, CNP, Aleyda Huffman, CNP,  Misa Yung, CNS, Ana Silva CNP, Petra Henry, CNP,   Sanjuana Galicia, CNP         [facility]   IN-PATIENT SERVICE   [facility]    Progress Note    1/21/2025    7:24 AM    Name:   Robert Connolly  MRN:     [id number]     Acct:      [id number]   Room:   311/311-01   Day:  3  Admit Date:  1/18/2025  3:09 PM    PCP:   No primary care provider on file.  Code Status:  Full Code    Subjective:     C/C:   Chief Complaint   Patient presents with    Fatigue    Fall     Pt arrives with co falling and leg weakness.Pt states he has been here for approx 5 mths from [place] and is due to go back on the 1/29/25. Per pt his family is concerned with his falling and would like to make sure he doesn't have clots in his legs before traveling.      Interval History Status: not changed.     Vitals reviewed, afebrile and hemodynamically stable.  Saturating well on room air.  Labs reviewed, mild hyperglycemia, LFTs overall improving and bilirubin improving

## 2025-01-21 NOTE — PLAN OF CARE
[facility]  Office: [phone]  Manuel Juarez DO, Paul Sharp DO, Joni Joseph DO, Stephen Romero DO, Aleksandr Barnes MD, Trinh Landry MD, Soren Meeks MD, Rebecca De Luna MD,  Hipolito Cantu MD, Do Boyd MD, Jn Amaya MD,  Nicole Pandey DO, Maranda Boswell MD, Young Srivastava MD, Zbigniew Juarez DO, Estephanie Milton MD,  Riley Barnes DO, Kiersten Vickers MD, Noris Couch MD, Rebecca Rebolledo MD, Ady Farfan MD,  Christophe Goodson MD, Randy Tariq MD, Paz Coon MD, Aye Jacobson MD, Nikolas Juarez MD, Caitlin Helms MD, Marin Butcher DO, Arnie Young MD, Nicole Bai MD, Mohsin Reza, MD, Shirley Waterhouse, CNP,  Linh Singh CNP, Marin Bryan, CNP,  Marcelina Zhang, DNP, Renu Romero, CNP, Marcella Jean, CNP, Lynnette Cameron, CNP, Sara Downs, CNP, Mellisa Izaguirre, PA-C, Kerri Marques PA-C, Araceli Block, CNP, Jose Jacobs, CNP,  Apoorva Aden, CNP, Andreea Nunez, CNP, Aleyda Huffman, CNP,  Misa Yung, CNS, Ana Silva, CNP, Petra Henry, CNP,   Sanjuana Galicia, CNP         [facility]   IN-PATIENT SERVICE   [facility]    Second Visit Note  For more detailed information please refer to the progress note of the day      1/20/2025    7:19 PM    Name:   Robert Connolly  MRN:     [id number]     Acct:      [id number]   Room:   311/311-01  IP Day:  2  Admit Date:  1/18/2025  3:09 PM    PCP:   No primary care provider on file.  Code Status:  Full Code        Pt vitals were reviewed   New labs were reviewed   Patient was seen    Updated plan :     Call to patient's brother vYon, phone number [phone] to update on clinical situation.  Discussed lab results.  Yvon advised that his brother was diagnosed with a progressive dementia several months ago while living in [place], has been on Namenda.  His dementia has been associated with shuffling gait with progression with speech difficulties.  Often cannot form complete sentences

## 2025-01-22 VITALS
TEMPERATURE: 97.7 F | OXYGEN SATURATION: 95 % | HEART RATE: 69 BPM | BODY MASS INDEX: 26.4 KG/M2 | RESPIRATION RATE: 16 BRPM | WEIGHT: 174.16 LBS | HEIGHT: 68 IN | DIASTOLIC BLOOD PRESSURE: 77 MMHG | SYSTOLIC BLOOD PRESSURE: 140 MMHG

## 2025-01-22 LAB
GLUCOSE BLD-MCNC: 258 MG/DL (ref 75–110)
GLUCOSE BLD-MCNC: 287 MG/DL (ref 75–110)
LKM AB TITR SER IF: NORMAL {TITER}
SMOOTH MUSCLE ANTIBODY: 5 UNITS (ref 0–19)

## 2025-01-22 PROCEDURE — 6370000000 HC RX 637 (ALT 250 FOR IP): Performed by: INTERNAL MEDICINE

## 2025-01-22 PROCEDURE — 2500000003 HC RX 250 WO HCPCS

## 2025-01-22 PROCEDURE — 82947 ASSAY GLUCOSE BLOOD QUANT: CPT

## 2025-01-22 PROCEDURE — 6360000002 HC RX W HCPCS

## 2025-01-22 PROCEDURE — 99239 HOSP IP/OBS DSCHRG MGMT >30: CPT | Performed by: STUDENT IN AN ORGANIZED HEALTH CARE EDUCATION/TRAINING PROGRAM

## 2025-01-22 RX ADMIN — SODIUM CHLORIDE, PRESERVATIVE FREE 10 ML: 5 INJECTION INTRAVENOUS at 09:46

## 2025-01-22 RX ADMIN — Medication 1000 UNITS: at 09:42

## 2025-01-22 RX ADMIN — ENOXAPARIN SODIUM 40 MG: 100 INJECTION SUBCUTANEOUS at 09:43

## 2025-01-22 RX ADMIN — MEMANTINE 5 MG: 5 TABLET ORAL at 09:42

## 2025-01-22 RX ADMIN — PANTOPRAZOLE SODIUM 40 MG: 40 TABLET, DELAYED RELEASE ORAL at 06:30

## 2025-01-22 RX ADMIN — FINASTERIDE 5 MG: 5 TABLET, FILM COATED ORAL at 09:42

## 2025-01-22 RX ADMIN — SERTRALINE HYDROCHLORIDE 25 MG: 25 TABLET ORAL at 09:42

## 2025-01-22 RX ADMIN — INSULIN LISPRO 2 UNITS: 100 INJECTION, SOLUTION INTRAVENOUS; SUBCUTANEOUS at 09:41

## 2025-01-22 RX ADMIN — INSULIN LISPRO 2 UNITS: 100 INJECTION, SOLUTION INTRAVENOUS; SUBCUTANEOUS at 12:47

## 2025-01-22 RX ADMIN — LOSARTAN POTASSIUM 50 MG: 50 TABLET, FILM COATED ORAL at 09:42

## 2025-01-22 RX ADMIN — TAMSULOSIN HYDROCHLORIDE 0.4 MG: 0.4 CAPSULE ORAL at 09:42

## 2025-01-22 RX ADMIN — METOPROLOL SUCCINATE 50 MG: 50 TABLET, EXTENDED RELEASE ORAL at 09:42

## 2025-01-22 NOTE — DISCHARGE INSTRUCTIONS
Follow up with your PCP as soon as possible. Call for an appointment as soon as possible.  Follow-up with specialist as instructed.  Call for an appointment as soon as possible.  -Please follow-up with gastroenterology regarding your elevated liver enzymes.  I have provided information for the gastroenterologist that saw you at [facility] you may follow-up with him or  a gastroenterologist in [place] by your home.  -Please follow-up with your neurologist given your progressive dementia as requested by family rather than starting a new evaluation here out of state from your home.  -Please follow-up with general surgery regarding  concern for gallbladder issues.  I have provided information for the general surgeon that you saw here at [facility] you may follow-up with  him for a general surgeon in [place] by your home.  Medications as instructed.  Return to the emergency department immediately for any new or worsening concerns.

## 2025-01-22 NOTE — PROGRESS NOTES
[facility]  Office: [phone]  Manuel Juarez DO, Paul Sharp DO, Joni Joseph DO, Stephen Romero DO, Aleksandr Barnes MD, Trinh Landry MD, Soren Meeks MD, Rebecca De Luna MD,  Hipolito Cantu MD, Do Boyd MD, Jn Amaya MD,  Nicole Pandey DO, Maranda Boswell MD, Young Srivastava MD, Zbigniew Juarez DO, Estephanie Milton MD,  Riley Barnes DO, Kiersten Vickers MD, Noris Couch MD, Rebecca Rebolledo MD, Ady Farfan MD,  Christophe Goodson MD, Randy Tariq MD, Paz Coon MD, Aye Jacobson MD, Nikolas Juarez MD, Caitlin Helms MD, Marin Butcher DO, Arnie Young MD, Nicole Bai MD, Mohsin Reza, MD, Shirley Waterhouse, CNP,  Linh Singh CNP, Marin Bryan, CNP,  Marcelina Zhang, DNP, Renu Romero, CNP, Marcella Jean, CNP, Lynnette Cameron, CNP, Sara Downs, CNP, Mellisa Izaguirre, PA-C, Kerri Marques, PA-C, Araceli Block, CNP, Jose Jacobs, CNP,  Apoorva Aden, CNP, Andreea Nunez, CNP, Aleyda Huffman, CNP,  Misa Yung, CNS, Ana Silva CNP, Petra Henry, CNP,   Sanjuana Galicia, CNP         [facility]   IN-PATIENT SERVICE   [facility]    Progress Note    1/22/2025    7:34 AM    Name:   Robert Connolly  MRN:     [id number]     Acct:      [id number]   Room:   311/311-01   Day:  4  Admit Date:  1/18/2025  3:09 PM    PCP:   No primary care provider on file.  Code Status:  Full Code    Subjective:     C/C:   Chief Complaint   Patient presents with    Fatigue    Fall     Pt arrives with co falling and leg weakness.Pt states he has been here for approx 5 mths from [place] and is due to go back on the 1/29/25. Per pt his family is concerned with his falling and would like to make sure he doesn't have clots in his legs before traveling.      Interval History Status: not changed.     Vitals reviewed, afebrile and hemodynamically stable. Saturating well on room air.  Labs reviewed.  CT chest abdomen and pelvis with IV contrast reviewed

## 2025-01-22 NOTE — DISCHARGE SUMMARY
[facility]  Office: [phone]  Manuel Juarez DO, Paul Sharp DO, Joni Joseph DO, Stephen Romero DO, Aleksandr Barnes MD, Trinh Landry MD, Soren Meeks MD, Rebecca De Luna MD,  Hipolito Cantu MD, Do Boyd MD, Jn Amaya MD,  Nicole Pandey DO, Maranda Boswell MD, Young Srivastava MD, Zbigniew Juarez DO, Estephanie Milton MD,  Riley Barnes DO, Kiersten Vickers MD, Noris Couch MD, Rebecca Rebolledo MD, Ady Farfan MD,  Christophe Goodson MD, Randy Tariq MD, Paz Coon MD, Aye Jacobson MD, Nikolas Juarez MD, Caitlin Helms MD, Marin Butcher DO, Arnie Young MD, Nicole Bai MD, Mohsin Reza, MD, Shirley Waterhouse, CNP,  Linh Singh CNP, Marin Bryan, CNP,  Marcelina Zhang, FABRICE, Renu Romero, CNP, Marcella Jean, CNP, Lynnette Cameron, CNP, Sara Downs, CNP, Mellisa Izaguirre, PA-C, Kerri Marques PA-C, Araceli Block, CNP, Jose Jacobs, CNP,  Apoorva Aden, CNP, Andreea Nunez, CNP, Aleyda Huffman, CNP,  Misa Yung, CNS, Ana Silva CNP, Petra Henry, CNP,   Sanjuana Galicia, CNP         [facility]   IN-PATIENT SERVICE   [facility]    Discharge Summary     Patient ID: Robert Connolly  :  1945   MRN: [id number]     ACCOUNT:  [id number]   Patient's PCP: No primary care provider on file.  Admit Date: 2025   Discharge Date: 2025     Length of Stay: 4  Code Status:  Full Code  Admitting Physician: Riley Barnes DO  Discharge Physician: Riley Barnes DO     Active Discharge Diagnoses:     Hospital Problem Lists:  Principal Problem:    Symptomatic cholelithiasis  Active Problems:    Diabetes mellitus type 2, controlled (HCC)    Primary hypertension    BPH (benign prostatic hyperplasia)    Dyslipidemia    Elevated LFTs    Cholecystitis    Elevated liver enzymes    Rapidly progressive dementia (HCC)  Resolved Problems:    * No resolved hospital problems. *    Admission Condition:  fair     Discharged Condition:

## 2025-01-22 NOTE — PLAN OF CARE
Problem: Safety - Adult  Goal: Free from fall injury  1/22/2025 1225 by Ara Stapleton LPN  Outcome: Completed  1/22/2025 0604 by Talia Wynne RN  Outcome: Progressing     Problem: Discharge Planning  Goal: Discharge to home or other facility with appropriate resources  1/22/2025 1225 by Ara Stapleton LPN  Outcome: Completed  Flowsheets (Taken 1/22/2025 0940)  Discharge to home or other facility with appropriate resources:   Identify barriers to discharge with patient and caregiver   Arrange for needed discharge resources and transportation as appropriate  1/22/2025 0604 by Talia Wynne RN  Outcome: Progressing     Problem: Musculoskeletal - Adult  Goal: Return mobility to safest level of function  1/22/2025 1225 by Ara Stapleton LPN  Outcome: Completed  Flowsheets (Taken 1/22/2025 0940)  Return Mobility to Safest Level of Function: Assess patient stability and activity tolerance for standing, transferring and ambulating with or without assistive devices  1/22/2025 0604 by Talia Wynne RN  Outcome: Progressing     Problem: Infection - Adult  Goal: Absence of infection at discharge  1/22/2025 1225 by Ara Stapleton LPN  Outcome: Completed  Flowsheets (Taken 1/22/2025 0940)  Absence of infection at discharge:   Assess and monitor for signs and symptoms of infection   Monitor lab/diagnostic results  1/22/2025 0604 by Talia Wynne RN  Outcome: Progressing     Problem: Hematologic - Adult  Goal: Maintains hematologic stability  1/22/2025 1225 by Ara Stapleton LPN  Outcome: Completed  Flowsheets (Taken 1/22/2025 0940)  Maintains hematologic stability: Assess for signs and symptoms of bleeding or hemorrhage  1/22/2025 0604 by Talia Wynne RN  Outcome: Progressing     Problem: Chronic Conditions and Co-morbidities  Goal: Patient's chronic conditions and co-morbidity symptoms are monitored and maintained or improved  1/22/2025 1225 by Ara Stapleton LPN  Outcome: Completed  Flowsheets (Taken

## 2025-01-22 NOTE — PLAN OF CARE
Problem: Safety - Adult  Goal: Free from fall injury  Outcome: Progressing     Problem: Discharge Planning  Goal: Discharge to home or other facility with appropriate resources  Outcome: Progressing     Problem: Musculoskeletal - Adult  Goal: Return mobility to safest level of function  Outcome: Progressing     Problem: Infection - Adult  Goal: Absence of infection at discharge  Outcome: Progressing     Problem: Hematologic - Adult  Goal: Maintains hematologic stability  Outcome: Progressing     Problem: Chronic Conditions and Co-morbidities  Goal: Patient's chronic conditions and co-morbidity symptoms are monitored and maintained or improved  Outcome: Progressing

## 2025-01-23 LAB
ANA SER QL IA: NEGATIVE
DSDNA IGG SER QL IA: 0.8 IU/ML
MITOCHONDRIA M2 IGG SER-ACNC: 0.9 U/ML (ref 0–4)
NUCLEAR IGG SER IA-RTO: 0.1 U/ML

## 2025-01-29 ENCOUNTER — HOSPITAL ENCOUNTER (EMERGENCY)
Age: 80
Discharge: HOME | End: 2025-01-29
Attending: EMERGENCY MEDICINE
Payer: MEDICARE

## 2025-01-29 ENCOUNTER — APPOINTMENT (OUTPATIENT)
Dept: GENERAL RADIOLOGY | Age: 80
End: 2025-01-29
Payer: MEDICARE

## 2025-01-29 VITALS
TEMPERATURE: 99.1 F | SYSTOLIC BLOOD PRESSURE: 149 MMHG | RESPIRATION RATE: 20 BRPM | DIASTOLIC BLOOD PRESSURE: 77 MMHG | OXYGEN SATURATION: 97 % | WEIGHT: 180.78 LBS | HEART RATE: 108 BPM | BODY MASS INDEX: 27.4 KG/M2 | HEIGHT: 68 IN

## 2025-01-29 DIAGNOSIS — R33.9 URINARY RETENTION: ICD-10-CM

## 2025-01-29 DIAGNOSIS — B34.9 VIRAL ILLNESS: Primary | ICD-10-CM

## 2025-01-29 LAB
ALBUMIN SERPL-MCNC: 4.2 G/DL (ref 3.5–5.2)
ALBUMIN/GLOB SERPL: 1.1 {RATIO} (ref 1–2.5)
ALP SERPL-CCNC: 112 U/L (ref 40–129)
ALT SERPL-CCNC: 26 U/L (ref 5–41)
ANION GAP SERPL CALCULATED.3IONS-SCNC: 12 MMOL/L (ref 9–17)
AST SERPL-CCNC: 22 U/L
BASOPHILS # BLD: 0.1 K/UL (ref 0–0.2)
BASOPHILS NFR BLD: 1 % (ref 0–2)
BILIRUB DIRECT SERPL-MCNC: 0.3 MG/DL
BILIRUB INDIRECT SERPL-MCNC: 0.9 MG/DL (ref 0–1)
BILIRUB SERPL-MCNC: 1.2 MG/DL (ref 0.3–1.2)
BILIRUB UR QL STRIP: NEGATIVE
BUN SERPL-MCNC: 8 MG/DL (ref 8–23)
CALCIUM SERPL-MCNC: 9.3 MG/DL (ref 8.6–10.4)
CHLORIDE SERPL-SCNC: 100 MMOL/L (ref 98–107)
CLARITY UR: CLEAR
CO2 SERPL-SCNC: 28 MMOL/L (ref 20–31)
COLOR UR: YELLOW
COMMENT: ABNORMAL
CREAT SERPL-MCNC: 0.8 MG/DL (ref 0.7–1.2)
EOSINOPHIL # BLD: 0.2 K/UL (ref 0–0.4)
EOSINOPHILS RELATIVE PERCENT: 2 % (ref 1–4)
ERYTHROCYTE [DISTWIDTH] IN BLOOD BY AUTOMATED COUNT: 13.5 % (ref 12.5–15.4)
FLUAV AG SPEC QL: NEGATIVE
FLUBV AG SPEC QL: NEGATIVE
GFR, ESTIMATED: >90 ML/MIN/1.73M2
GLUCOSE SERPL-MCNC: 150 MG/DL (ref 70–99)
GLUCOSE UR STRIP-MCNC: ABNORMAL MG/DL
HCT VFR BLD AUTO: 45.5 % (ref 41–53)
HGB BLD-MCNC: 15.8 G/DL (ref 13.5–17.5)
HGB UR QL STRIP.AUTO: NEGATIVE
KETONES UR STRIP-MCNC: NEGATIVE MG/DL
LEUKOCYTE ESTERASE UR QL STRIP: NEGATIVE
LYMPHOCYTES NFR BLD: 0.9 K/UL (ref 1–4.8)
LYMPHOCYTES RELATIVE PERCENT: 8 % (ref 24–44)
MCH RBC QN AUTO: 31.7 PG (ref 26–34)
MCHC RBC AUTO-ENTMCNC: 34.7 G/DL (ref 31–37)
MCV RBC AUTO: 91.4 FL (ref 80–100)
MONOCYTES NFR BLD: 0.6 K/UL (ref 0.1–1.2)
MONOCYTES NFR BLD: 6 % (ref 2–11)
NEUTROPHILS NFR BLD: 83 % (ref 36–66)
NEUTS SEG NFR BLD: 8.6 K/UL (ref 1.8–7.7)
NITRITE UR QL STRIP: NEGATIVE
PH UR STRIP: 7 [PH] (ref 5–8)
PLATELET # BLD AUTO: 213 K/UL (ref 140–450)
PMV BLD AUTO: 7.9 FL (ref 6–12)
POTASSIUM SERPL-SCNC: 4 MMOL/L (ref 3.7–5.3)
PROT SERPL-MCNC: 7.9 G/DL (ref 6.4–8.3)
PROT UR STRIP-MCNC: NEGATIVE MG/DL
RBC # BLD AUTO: 4.98 M/UL (ref 4.5–5.9)
SARS-COV-2 RDRP RESP QL NAA+PROBE: NOT DETECTED
SODIUM SERPL-SCNC: 140 MMOL/L (ref 135–144)
SP GR UR STRIP: 1.01 (ref 1–1.03)
SPECIMEN DESCRIPTION: NORMAL
TROPONIN I SERPL HS-MCNC: 11 NG/L (ref 0–22)
UROBILINOGEN UR STRIP-ACNC: NORMAL EU/DL (ref 0–1)
WBC OTHER # BLD: 10.3 K/UL (ref 3.5–11)

## 2025-01-29 PROCEDURE — 99285 EMERGENCY DEPT VISIT HI MDM: CPT | Performed by: EMERGENCY MEDICINE

## 2025-01-29 PROCEDURE — 80076 HEPATIC FUNCTION PANEL: CPT

## 2025-01-29 PROCEDURE — 87635 SARS-COV-2 COVID-19 AMP PRB: CPT

## 2025-01-29 PROCEDURE — 85025 COMPLETE CBC W/AUTO DIFF WBC: CPT

## 2025-01-29 PROCEDURE — 6370000000 HC RX 637 (ALT 250 FOR IP): Performed by: NURSE PRACTITIONER

## 2025-01-29 PROCEDURE — 81003 URINALYSIS AUTO W/O SCOPE: CPT

## 2025-01-29 PROCEDURE — 80048 BASIC METABOLIC PNL TOTAL CA: CPT

## 2025-01-29 PROCEDURE — 84484 ASSAY OF TROPONIN QUANT: CPT

## 2025-01-29 PROCEDURE — 2580000003 HC RX 258: Performed by: NURSE PRACTITIONER

## 2025-01-29 PROCEDURE — 96360 HYDRATION IV INFUSION INIT: CPT | Performed by: EMERGENCY MEDICINE

## 2025-01-29 PROCEDURE — 87804 INFLUENZA ASSAY W/OPTIC: CPT

## 2025-01-29 PROCEDURE — 93005 ELECTROCARDIOGRAM TRACING: CPT | Performed by: EMERGENCY MEDICINE

## 2025-01-29 PROCEDURE — 71045 X-RAY EXAM CHEST 1 VIEW: CPT

## 2025-01-29 RX ORDER — LIDOCAINE HYDROCHLORIDE 20 MG/ML
JELLY TOPICAL ONCE
Status: COMPLETED | OUTPATIENT
Start: 2025-01-29 | End: 2025-01-29

## 2025-01-29 RX ORDER — 0.9 % SODIUM CHLORIDE 0.9 %
500 INTRAVENOUS SOLUTION INTRAVENOUS ONCE
Status: COMPLETED | OUTPATIENT
Start: 2025-01-29 | End: 2025-01-29

## 2025-01-29 RX ADMIN — LIDOCAINE HYDROCHLORIDE: 20 JELLY TOPICAL at 18:46

## 2025-01-29 RX ADMIN — SODIUM CHLORIDE 500 ML: 9 INJECTION, SOLUTION INTRAVENOUS at 16:53

## 2025-01-29 ASSESSMENT — PAIN - FUNCTIONAL ASSESSMENT: PAIN_FUNCTIONAL_ASSESSMENT: NONE - DENIES PAIN

## 2025-01-29 NOTE — ED PROVIDER NOTES
[facility] Emergency Department      Pt Name: Robert Connolly  MRN: [id number]  Birthdate 1945  Date of evaluation: 1/29/2025    EMERGENCY DEPARTMENT ENCOUNTER      PERTINENT ATTENDING PHYSICIAN COMMENTS:      Faculty Attestation    I performed a history and physical examination of the patient and discussed management with the mid level provideer. I reviewed the mid level provider's note and agree with the documented findings and plan of care.Any areas of disagreement are noted on the chart. I was personally present for the key portions of any procedures. I have documented in the chart those procedures where I was not present during the key portions. I have reviewed the emergency nurses triage note. I agree with the chief complaint, past medical history, past surgical history, allergies, medications, social and family history as documented unless otherwise noted below. Documentation of the HPI, Physical Exam and Medical Decision Making performed by medical students or scribes is based on my personal performance of the HPI, PE and MDM. For Residents/Physician Assistant/ Nurse Practitioner cases/documentation I have personally evaluated this patient and have completed at least one if not all key elements of the E/M (history, physical exam, and MDM). Additional findings are as noted.    CHIEF COMPLAINT       Chief Complaint   Patient presents with    Pharyngitis    Fatigue     Fatigue, cough, headaches.  Onset couple days ago.  Recent admission       HISTORY OF PRESENT ILLNESS    Robert Connolly is a 79 y.o. male who presents to the emergency room complaining of fatigue sore throat and cough with bodyaches.  Symptoms started 2 to 3 days ago.  Recently admitted to the hospital for concern for cholecystitis which was ultimately deemed not likely.  He felt better after being discharged but then the symptoms started 2 to 3 days ago.  He denies any chest pain or shortness of breath.  He says he feels tired.  No

## 2025-01-29 NOTE — ED PROVIDER NOTES
[facility] EMERGENCY DEPARTMENT  EMERGENCY DEPARTMENT ENCOUNTER      Pt Name: Robert Connolly  MRN: [id number]  Birthdate 1945  Date of evaluation: 1/29/2025  Provider: CHARISSE Fraire CNP  11:37 AM    CHIEF COMPLAINT       Chief Complaint   Patient presents with    Pharyngitis    Fatigue     Fatigue, cough, headaches.  Onset couple days ago.  Recent admission         HISTORY OF PRESENT ILLNESS    Robert Connolly is a 79 y.o. male who presents to the emergency department for evaluation of fatigue, cough headaches fevers.  Patient's friend at the bedside who he is staying with has similar symptoms.  He has been staying with a friend for about 6 months was visiting from [place] was due to go back today but due to the illness he canceled the flight.  He was recently admitted here with concerns for possible gallbladder attack was ultimately discharged last week and has been doing well eating and drinking.  Yesterday, he woke up with a headache and a cough and nasal congestion    HPI    Nursing Notes were reviewed.    REVIEW OF SYSTEMS       Review of Systems   All other systems reviewed and are negative.      Except as noted above the remainder of the review of systems was reviewed and negative.       PAST MEDICAL HISTORY     Past Medical History:   Diagnosis Date    BPH (benign prostatic hyperplasia)     Diabetes mellitus type 2, controlled (HCC)     Dyslipidemia     Hypertension          SURGICAL HISTORY       Past Surgical History:   Procedure Laterality Date    CATARACT EXTRACTION           CURRENT MEDICATIONS       Discharge Medication List as of 1/29/2025  8:28 PM        CONTINUE these medications which have NOT CHANGED    Details   VITAMIN D PO Take by mouthHistorical Med      sitaGLIPtan-metFORMIN (JANUMET)  MG per tablet Take 1 tablet by mouth 2 times daily (with meals)Historical Med      famotidine (PEPCID) 40 MG tablet Take 1 tablet by mouth 2 times dailyHistorical Med

## 2025-01-30 LAB
EKG ATRIAL RATE: 108 BPM
EKG P AXIS: 51 DEGREES
EKG P-R INTERVAL: 196 MS
EKG Q-T INTERVAL: 338 MS
EKG QRS DURATION: 76 MS
EKG QTC CALCULATION (BAZETT): 452 MS
EKG R AXIS: -6 DEGREES
EKG T AXIS: 75 DEGREES
EKG VENTRICULAR RATE: 108 BPM

## 2025-01-30 PROCEDURE — 93010 ELECTROCARDIOGRAM REPORT: CPT | Performed by: INTERNAL MEDICINE

## 2025-01-30 NOTE — DISCHARGE INSTRUCTIONS
Home with your son-in-law for now.  Continue plans for home to [place] as you are able.    Given the urinary retention, you will need to have a Vicente catheter in place until you can follow with urology.  We have given you Dr. Moses's name you can follow-up with him while you are here in [place]    Otherwise you may continue follow-up with urology at home when you are able    Return to the emergency department for worsening symptoms or concerns.    Tylenol for any fevers.  Return for increased confusion, chest pain, shortness of breath, abdominal pain, nausea vomiting, or any other concerns

## 2025-01-30 NOTE — ED NOTES
Ambulation trial, pt able to ambulate with walker slowly. Son in law states that he can purchase a walker and will assist pt. Krysta LAMB notified.

## 2025-02-03 ENCOUNTER — HOSPITAL ENCOUNTER (EMERGENCY)
Age: 80
Discharge: HOME | End: 2025-02-03
Attending: EMERGENCY MEDICINE
Payer: MEDICARE

## 2025-02-03 VITALS
BODY MASS INDEX: 27.28 KG/M2 | HEART RATE: 73 BPM | TEMPERATURE: 98.4 F | WEIGHT: 180 LBS | DIASTOLIC BLOOD PRESSURE: 70 MMHG | RESPIRATION RATE: 16 BRPM | HEIGHT: 68 IN | OXYGEN SATURATION: 98 % | SYSTOLIC BLOOD PRESSURE: 150 MMHG

## 2025-02-03 DIAGNOSIS — N30.01 ACUTE CYSTITIS WITH HEMATURIA: Primary | ICD-10-CM

## 2025-02-03 LAB
ANION GAP SERPL CALCULATED.3IONS-SCNC: 8 MMOL/L (ref 9–17)
BACTERIA URNS QL MICRO: ABNORMAL
BASOPHILS # BLD: 0.1 K/UL (ref 0–0.2)
BASOPHILS NFR BLD: 1 % (ref 0–2)
BILIRUB UR QL STRIP: NEGATIVE
BUN SERPL-MCNC: 10 MG/DL (ref 8–23)
CALCIUM SERPL-MCNC: 8.7 MG/DL (ref 8.6–10.4)
CHARACTER UR: ABNORMAL
CHLORIDE SERPL-SCNC: 102 MMOL/L (ref 98–107)
CLARITY UR: CLEAR
CO2 SERPL-SCNC: 29 MMOL/L (ref 20–31)
COLOR UR: YELLOW
CREAT SERPL-MCNC: 0.9 MG/DL (ref 0.7–1.2)
EOSINOPHIL # BLD: 0.1 K/UL (ref 0–0.4)
EOSINOPHILS RELATIVE PERCENT: 2 % (ref 1–4)
EPI CELLS #/AREA URNS HPF: ABNORMAL /HPF (ref 0–5)
ERYTHROCYTE [DISTWIDTH] IN BLOOD BY AUTOMATED COUNT: 13.6 % (ref 12.5–15.4)
GFR, ESTIMATED: 87 ML/MIN/1.73M2
GLUCOSE SERPL-MCNC: 130 MG/DL (ref 70–99)
GLUCOSE UR STRIP-MCNC: ABNORMAL MG/DL
HCT VFR BLD AUTO: 43 % (ref 41–53)
HGB BLD-MCNC: 14.7 G/DL (ref 13.5–17.5)
HGB UR QL STRIP.AUTO: ABNORMAL
KETONES UR STRIP-MCNC: NEGATIVE MG/DL
LEUKOCYTE ESTERASE UR QL STRIP: NEGATIVE
LYMPHOCYTES NFR BLD: 1.8 K/UL (ref 1–4.8)
LYMPHOCYTES RELATIVE PERCENT: 33 % (ref 24–44)
MCH RBC QN AUTO: 31.1 PG (ref 26–34)
MCHC RBC AUTO-ENTMCNC: 34.1 G/DL (ref 31–37)
MCV RBC AUTO: 91.1 FL (ref 80–100)
MONOCYTES NFR BLD: 0.5 K/UL (ref 0.1–1.2)
MONOCYTES NFR BLD: 9 % (ref 2–11)
NEUTROPHILS NFR BLD: 55 % (ref 36–66)
NEUTS SEG NFR BLD: 3.1 K/UL (ref 1.8–7.7)
NITRITE UR QL STRIP: NEGATIVE
PH UR STRIP: 6 [PH] (ref 5–8)
PLATELET # BLD AUTO: 165 K/UL (ref 140–450)
PMV BLD AUTO: 8 FL (ref 6–12)
POTASSIUM SERPL-SCNC: 4.6 MMOL/L (ref 3.7–5.3)
PROT UR STRIP-MCNC: ABNORMAL MG/DL
RBC # BLD AUTO: 4.72 M/UL (ref 4.5–5.9)
RBC #/AREA URNS HPF: ABNORMAL /HPF (ref 0–2)
SODIUM SERPL-SCNC: 139 MMOL/L (ref 135–144)
SP GR UR STRIP: 1.01 (ref 1–1.03)
UROBILINOGEN UR STRIP-ACNC: NORMAL EU/DL (ref 0–1)
WBC #/AREA URNS HPF: ABNORMAL /HPF (ref 0–5)
WBC OTHER # BLD: 5.6 K/UL (ref 3.5–11)

## 2025-02-03 PROCEDURE — 87086 URINE CULTURE/COLONY COUNT: CPT

## 2025-02-03 PROCEDURE — 36415 COLL VENOUS BLD VENIPUNCTURE: CPT

## 2025-02-03 PROCEDURE — 85025 COMPLETE CBC W/AUTO DIFF WBC: CPT

## 2025-02-03 PROCEDURE — 99283 EMERGENCY DEPT VISIT LOW MDM: CPT

## 2025-02-03 PROCEDURE — 81001 URINALYSIS AUTO W/SCOPE: CPT

## 2025-02-03 PROCEDURE — 6370000000 HC RX 637 (ALT 250 FOR IP): Performed by: NURSE PRACTITIONER

## 2025-02-03 PROCEDURE — 80048 BASIC METABOLIC PNL TOTAL CA: CPT

## 2025-02-03 PROCEDURE — 51798 US URINE CAPACITY MEASURE: CPT

## 2025-02-03 RX ORDER — CEPHALEXIN 500 MG/1
500 CAPSULE ORAL 2 TIMES DAILY
Qty: 14 CAPSULE | Refills: 0 | Status: SHIPPED | OUTPATIENT
Start: 2025-02-03 | End: 2025-02-10

## 2025-02-03 RX ADMIN — CEPHALEXIN 500 MG: 250 CAPSULE ORAL at 13:49

## 2025-02-03 ASSESSMENT — PAIN DESCRIPTION - PAIN TYPE: TYPE: ACUTE PAIN

## 2025-02-03 ASSESSMENT — ENCOUNTER SYMPTOMS
SHORTNESS OF BREATH: 0
DIARRHEA: 0
ABDOMINAL PAIN: 0
COUGH: 0
CONSTIPATION: 0
VOMITING: 0
BACK PAIN: 0
NAUSEA: 0
EYE DISCHARGE: 0

## 2025-02-03 ASSESSMENT — PAIN SCALES - GENERAL: PAINLEVEL_OUTOF10: 5

## 2025-02-03 ASSESSMENT — PAIN - FUNCTIONAL ASSESSMENT: PAIN_FUNCTIONAL_ASSESSMENT: 0-10

## 2025-02-03 ASSESSMENT — LIFESTYLE VARIABLES: HOW OFTEN DO YOU HAVE A DRINK CONTAINING ALCOHOL: NEVER

## 2025-02-03 ASSESSMENT — PAIN DESCRIPTION - LOCATION: LOCATION: ABDOMEN

## 2025-02-03 NOTE — DISCHARGE INSTRUCTIONS
Complete antibiotic as prescribed.    Return to the ER: Fevers, mentation or behavior changes, weakness, abdominal pain, nausea, vomiting, no urine output in the Vicente catheter bag; or any other concerning symptoms

## 2025-02-03 NOTE — ED NOTES
Vicente bag changed and draining urine- irrigation performed - irrigates well and draining out yellow to sl pink urine; draining well to bag; bladder scan performed prior and indicating 1ml urine in bladder - no current retention noted. Pt now states no discomfort at this time. Provider to bedside.

## 2025-02-03 NOTE — ED PROVIDER NOTES
[facility] EMERGENCY DEPARTMENT  EMERGENCY DEPARTMENT ENCOUNTER      Pt Name: Robert Connolly  MRN: [id number]  Birthdate 1945  Date of evaluation: 2/3/2025  Provider: CHARISSE Hargrove CNP  8:52 PM    CHIEF COMPLAINT       Chief Complaint   Patient presents with    urine catheter trouble     Had urinary medina catheter placed in ER [place] on 1/29/25 for urinary retention  - states was doing fine but noticed yesterday not feeling well again. Noting small amt jose ramon urine in medina bag. Present w/ daughter - hx of dementia - here from [place].          HISTORY OF PRESENT ILLNESS    Robert Connolly is a 79 y.o. male who presents to the emergency department      This is a nontoxic-appearing 79-year-old male presenting to the emergency department via private auto with his daughter, the patient resides in [place] was here visiting for the holidays when he had admission to the hospital for cholelithiasis, the patient was last evaluated in the [facility] emergency department on 1/29/2025 diagnosed with acute urinary retention with 600 mL of fluid in his bladder at that time, also had upper respiratory-like infection symptoms at that time; the patient is returning to the emergency department today, reports that the tape holding the catheter to his right leg had twisted and fell off pinching the right leg, and they noticed decreased amounts of urine in the Medina catheter bag, the patient has been eating and drinking without difficulty reports having a normal bowel movement yesterday, has not had fevers and reports that his nasal congestion cough symptoms are improving.  The patient has a appointment established with urology this coming Wednesday.  The patient supposed to be returning to [place] on February 11.  The patient has history of dementia/traumatic brain injuries.    The history is provided by the patient, medical records and a relative.       Nursing Notes were reviewed.    REVIEW OF SYSTEMS

## 2025-02-03 NOTE — ED PROVIDER NOTES
[facility] Emergency Department  [address]  [place] [address]  Phone: [phone]  Fax: [phone]      Attending Physician Attestation    I personally made/approves the management plan for this patient's and take responsibility for the patient management.      (Please note that portions of this note were completed with a voice recognition program.  Efforts were made to edit the dictations but occasionally words are mis-transcribed.)    Marty Norris MD  Attending Emergency Medicine Physician        Marty Norris MD  02/03/25 [id number]

## 2025-02-03 NOTE — ED NOTES
Urinary cath emptied of 350ml clear yellow urine; urine bag intact and pt/dtr instructed on bag use/ and FU w/ urology w/ appt already.

## 2025-02-04 LAB
MICROORGANISM SPEC CULT: NO GROWTH
SERVICE CMNT-IMP: NORMAL
SPECIMEN DESCRIPTION: NORMAL

## 2025-02-06 ENCOUNTER — HOSPITAL ENCOUNTER (EMERGENCY)
Age: 80
Discharge: HOME | End: 2025-02-07
Attending: EMERGENCY MEDICINE
Payer: MEDICARE

## 2025-02-06 DIAGNOSIS — R33.9 URINARY RETENTION: Primary | ICD-10-CM

## 2025-02-06 PROCEDURE — 51798 US URINE CAPACITY MEASURE: CPT

## 2025-02-06 PROCEDURE — 99283 EMERGENCY DEPT VISIT LOW MDM: CPT | Performed by: EMERGENCY MEDICINE

## 2025-02-07 VITALS
SYSTOLIC BLOOD PRESSURE: 152 MMHG | BODY MASS INDEX: 26.83 KG/M2 | WEIGHT: 177 LBS | OXYGEN SATURATION: 98 % | DIASTOLIC BLOOD PRESSURE: 63 MMHG | HEIGHT: 68 IN | RESPIRATION RATE: 16 BRPM | HEART RATE: 81 BPM | TEMPERATURE: 97.7 F

## 2025-02-07 LAB
BACTERIA URNS QL MICRO: ABNORMAL
BILIRUB UR QL STRIP: NEGATIVE
CHARACTER UR: ABNORMAL
CLARITY UR: CLEAR
COLOR UR: YELLOW
EPI CELLS #/AREA URNS HPF: ABNORMAL /HPF (ref 0–5)
GLUCOSE UR STRIP-MCNC: ABNORMAL MG/DL
HGB UR QL STRIP.AUTO: ABNORMAL
KETONES UR STRIP-MCNC: NEGATIVE MG/DL
LEUKOCYTE ESTERASE UR QL STRIP: NEGATIVE
NITRITE UR QL STRIP: NEGATIVE
PH UR STRIP: 5.5 [PH] (ref 5–8)
PROT UR STRIP-MCNC: NEGATIVE MG/DL
RBC #/AREA URNS HPF: ABNORMAL /HPF (ref 0–2)
SP GR UR STRIP: 1.01 (ref 1–1.03)
UROBILINOGEN UR STRIP-ACNC: NORMAL EU/DL (ref 0–1)
WBC #/AREA URNS HPF: ABNORMAL /HPF (ref 0–5)

## 2025-02-07 PROCEDURE — 81001 URINALYSIS AUTO W/SCOPE: CPT

## 2025-02-07 ASSESSMENT — PAIN - FUNCTIONAL ASSESSMENT: PAIN_FUNCTIONAL_ASSESSMENT: NONE - DENIES PAIN

## 2025-02-07 NOTE — DISCHARGE INSTRUCTIONS
Keep your Vicente in place until you follow-up with your urologist in [place].  You can use the leg bag for traveling.  Be sure you are emptying the bag before it is more than half full.  Continue your Keflex

## 2025-02-07 NOTE — ED PROVIDER NOTES
[facility] Emergency Department  [address]  [place] [address]  Phone: [phone]  Fax: [phone]        Pt Name: Robert Connolly  MRN: [id number]  Birthdate 1945  Date of evaluation: 25    CHIEFCOMPLAINT       Chief Complaint   Patient presents with    Urinary Retention     Had catheter removed yesterday, has not been able to urinate very well since        HISTORY OF PRESENT ILLNESS (Location/Symptom, Timing/Onset, Context/Setting, Quality, Duration, Modifying Factors, Severity)      Robert Connolly is a 79 y.o. male with no pertinent PMH who presents to the ED via private auto with urinary retention.  79-year-old male who is from [place] here visiting with his daughter at some point during his visit he had to have a Vicente placed for urinary retention.  He was evaluated with [facility] yesterday and had the Vicente removed.  Since that time he has only dribbled urine yesterday and last dribbled urine this morning has not had any urine output since this morning.  He is due to return to [place] on .    PAST MEDICAL / SURGICAL / SOCIAL / FAMILY HISTORY     PMH:  has a past medical history of BPH (benign prostatic hyperplasia), Diabetes mellitus type 2, controlled (HCC), Dyslipidemia, Hypertension, and Urinary retention.  Surgical History:  has a past surgical history that includes Cataract extraction.  Social History:  reports that he has never smoked. He has never used smokeless tobacco. He reports that he does not drink alcohol and does not use drugs.  Family History: He indicated that his mother is . He indicated that his father is .   family history includes Cancer in his mother.  Psychiatric History: None    Allergies: Cyclobenzaprine and Penicillins    Home Medications:   Prior to Admission medications    Medication Sig Start Date End Date Taking? Authorizing Provider   cephALEXin (KEFLEX) 500 MG capsule Take 1 capsule by mouth 2 times daily  appendix.  Moderate stool throughout the colon. Pelvis: Prostate is mildly enlarged.  Urinary bladder is distended without acute inflammation.  No lymphadenopathy or free fluid. Peritoneum/Retroperitoneum: No lymphadenopathy or ascites.  Extensive atherosclerotic disease without abdominal aortic aneurysm. Bones/Soft Tissues: Mild osteoarthrosis of the hips.  Scattered degenerative changes throughout the lumbar spine.     1. No pulmonary embolism. 2. Minimal pleural effusions with adjacent atelectasis. 3. The gallbladder appears mildly inflamed.  Suggest ultrasound for further evaluation. 4. Nonobstructive right nephrolithiasis. 5. Severe atherosclerotic disease.     XR CHEST (SINGLE VIEW FRONTAL)    Result Date: 1/18/2025  EXAMINATION: ONE XRAY VIEW OF THE CHEST 1/18/2025 5:14 pm COMPARISON: None. HISTORY: ORDERING SYSTEM PROVIDED HISTORY: syncope TECHNOLOGIST PROVIDED HISTORY: syncope Reason for Exam: syncope FINDINGS: Lines and tubes: None The lungs are clear.  Heart size is normal.     No acute cardiopulmonary process.     CT HEAD WO CONTRAST    Result Date: 1/18/2025  EXAMINATION: CT OF THE HEAD WITHOUT CONTRAST  1/18/2025 4:51 pm TECHNIQUE: CT of the head was performed without the administration of intravenous contrast. Automated exposure control, iterative reconstruction, and/or weight based adjustment of the mA/kV was utilized to reduce the radiation dose to as low as reasonably achievable. COMPARISON: None. HISTORY: ORDERING SYSTEM PROVIDED HISTORY: falls TECHNOLOGIST PROVIDED HISTORY: falls Reason for Exam: fall FINDINGS: BRAIN/VENTRICLES: There is no acute intracranial hemorrhage, mass effect or midline shift.  No abnormal extra-axial fluid collection.  The gray-white differentiation is maintained without evidence of an acute infarct.  There is no evidence of hydrocephalus. Mild diffuse cerebral atrophy.  Periventricular and subcortical white matter hypodensities are present. ORBITS: The visualized portion